# Patient Record
Sex: MALE | Race: WHITE | NOT HISPANIC OR LATINO | URBAN - METROPOLITAN AREA
[De-identification: names, ages, dates, MRNs, and addresses within clinical notes are randomized per-mention and may not be internally consistent; named-entity substitution may affect disease eponyms.]

---

## 2019-12-10 VITALS — BODY MASS INDEX: 27.64 KG/M2 | WEIGHT: 172 LBS | HEIGHT: 66 IN

## 2019-12-11 ENCOUNTER — HOSPITAL ENCOUNTER (OUTPATIENT)
Dept: GASTROENTEROLOGY | Facility: HOSPITAL | Age: 74
Setting detail: OUTPATIENT SURGERY
Discharge: HOME | End: 2019-12-11
Attending: SURGERY

## 2020-01-07 ENCOUNTER — ANESTHESIA EVENT (OUTPATIENT)
Dept: GASTROENTEROLOGY | Facility: HOSPITAL | Age: 75
End: 2020-01-07

## 2020-01-07 NOTE — PRE-PROCEDURE INSTRUCTIONS
No outpatient medications have been marked as taking for the 1/8/20 encounter [facility] Encounter) with Tunde Travis 118 02

## 2020-01-07 NOTE — ANESTHESIA PREPROCEDURE EVALUATION
Review of Systems/Medical History  Patient summary reviewed  Chart reviewed  No history of anesthetic complications     Cardiovascular  Exercise tolerance (METS): >4,     Pulmonary  Smoker ex-smoker  , No COPD , No asthma , No recent URI , No sleep apnea ,        GI/Hepatic    Liver disease , Hepatitis B,        Negative  ROS        Endo/Other  No diabetes , No history of thyroid disease ,      GYN       Hematology  Negative hematology ROS      Musculoskeletal    Arthritis     Neurology  Negative neurology ROS      Psychology   Negative psychology ROS              Physical Exam    Airway    Mallampati score: II  TM Distance: >3 FB  Neck ROM: full     Dental   upper dentures and lower dentures,     Cardiovascular  Cardiovascular exam normal    Pulmonary  Pulmonary exam normal     Other Findings        Anesthesia Plan  ASA Score- 2     Anesthesia Type- IV sedation with anesthesia with ASA Monitors  Additional Monitors:   Airway Plan:         Plan Factors-Patient not instructed to abstain from smoking on day of procedure  Patient did not smoke on day of surgery  Induction- intravenous  Postoperative Plan-     Informed Consent- Anesthetic plan and risks discussed with patient  I personally reviewed this patient with the CRNA  Discussed and agreed on the Anesthesia Plan with the CRNA  Jayashree Hensley 16-Apr-2018

## 2020-01-08 ENCOUNTER — HOSPITAL ENCOUNTER (OUTPATIENT)
Dept: GASTROENTEROLOGY | Facility: HOSPITAL | Age: 75
Setting detail: OUTPATIENT SURGERY
Discharge: HOME | End: 2020-01-08
Attending: SURGERY | Admitting: SURGERY
Payer: COMMERCIAL

## 2020-01-08 ENCOUNTER — ANESTHESIA (OUTPATIENT)
Dept: GASTROENTEROLOGY | Facility: HOSPITAL | Age: 75
End: 2020-01-08

## 2020-01-08 VITALS
SYSTOLIC BLOOD PRESSURE: 130 MMHG | HEIGHT: 66 IN | WEIGHT: 172 LBS | HEART RATE: 57 BPM | TEMPERATURE: 98.1 F | BODY MASS INDEX: 27.64 KG/M2 | RESPIRATION RATE: 18 BRPM | OXYGEN SATURATION: 97 % | DIASTOLIC BLOOD PRESSURE: 60 MMHG

## 2020-01-08 DIAGNOSIS — K62.5 HEMORRHAGE OF ANUS AND RECTUM: ICD-10-CM

## 2020-01-08 RX ORDER — PROPOFOL 10 MG/ML
INJECTION, EMULSION INTRAVENOUS AS NEEDED
Status: DISCONTINUED | OUTPATIENT
Start: 2020-01-08 | End: 2020-01-08 | Stop reason: SURG

## 2020-01-08 RX ORDER — LIDOCAINE HYDROCHLORIDE 10 MG/ML
INJECTION, SOLUTION EPIDURAL; INFILTRATION; INTRACAUDAL; PERINEURAL AS NEEDED
Status: DISCONTINUED | OUTPATIENT
Start: 2020-01-08 | End: 2020-01-08 | Stop reason: SURG

## 2020-01-08 RX ORDER — SODIUM CHLORIDE 9 MG/ML
125 INJECTION, SOLUTION INTRAVENOUS CONTINUOUS
Status: DISCONTINUED | OUTPATIENT
Start: 2020-01-08 | End: 2020-01-12 | Stop reason: HOSPADM

## 2020-01-08 RX ORDER — SODIUM CHLORIDE 9 MG/ML
INJECTION, SOLUTION INTRAVENOUS CONTINUOUS PRN
Status: DISCONTINUED | OUTPATIENT
Start: 2020-01-08 | End: 2020-01-08 | Stop reason: SURG

## 2020-01-08 RX ADMIN — PROPOFOL 100 MG: 10 INJECTION, EMULSION INTRAVENOUS at 10:00

## 2020-01-08 RX ADMIN — SODIUM CHLORIDE 125 ML/HR: 0.9 INJECTION, SOLUTION INTRAVENOUS at 09:07

## 2020-01-08 RX ADMIN — LIDOCAINE HYDROCHLORIDE 50 MG: 10 INJECTION, SOLUTION EPIDURAL; INFILTRATION; INTRACAUDAL; PERINEURAL at 10:00

## 2020-01-08 RX ADMIN — SODIUM CHLORIDE: 0.9 INJECTION, SOLUTION INTRAVENOUS at 10:00

## 2020-01-08 RX ADMIN — PROPOFOL 100 MG: 10 INJECTION, EMULSION INTRAVENOUS at 10:04

## 2021-04-14 RX ORDER — HYDROXYZINE HYDROCHLORIDE 25 MG/1
25 TABLET, FILM COATED ORAL EVERY 6 HOURS PRN
COMMUNITY
Start: 2020-08-08

## 2021-04-18 ENCOUNTER — TELEPHONE (OUTPATIENT)
Dept: OTHER | Facility: OTHER | Age: 76
End: 2021-04-18

## 2021-04-18 NOTE — TELEPHONE ENCOUNTER
Patient isn't feeling well and called to cancel appointment with Dr Casimiro Corona for 4/19 at 8:30am  Will call at a later date to reschedule

## 2021-06-08 PROBLEM — H91.90 HEARING LOSS: Status: ACTIVE | Noted: 2021-06-08

## 2021-06-08 PROBLEM — I10 ESSENTIAL HYPERTENSION: Status: ACTIVE | Noted: 2021-06-08

## 2021-06-09 ENCOUNTER — TELEPHONE (OUTPATIENT)
Dept: OTHER | Facility: OTHER | Age: 76
End: 2021-06-09

## 2022-10-03 ENCOUNTER — OFFICE VISIT (OUTPATIENT)
Dept: FAMILY MEDICINE CLINIC | Facility: CLINIC | Age: 77
End: 2022-10-03
Payer: COMMERCIAL

## 2022-10-03 VITALS
DIASTOLIC BLOOD PRESSURE: 80 MMHG | OXYGEN SATURATION: 98 % | SYSTOLIC BLOOD PRESSURE: 142 MMHG | HEART RATE: 78 BPM | TEMPERATURE: 97.9 F | HEIGHT: 66 IN | BODY MASS INDEX: 26.44 KG/M2 | RESPIRATION RATE: 16 BRPM | WEIGHT: 164.5 LBS

## 2022-10-03 DIAGNOSIS — Z71.89 ADVANCED CARE PLANNING/COUNSELING DISCUSSION: ICD-10-CM

## 2022-10-03 DIAGNOSIS — Z13.31 DEPRESSION SCREENING NEGATIVE: ICD-10-CM

## 2022-10-03 DIAGNOSIS — Z12.5 PROSTATE CANCER SCREENING: ICD-10-CM

## 2022-10-03 DIAGNOSIS — Z11.59 NEED FOR HEPATITIS C SCREENING TEST: ICD-10-CM

## 2022-10-03 DIAGNOSIS — Z28.21 PNEUMOCOCCAL VACCINATION DECLINED: ICD-10-CM

## 2022-10-03 DIAGNOSIS — Z00.00 MEDICARE ANNUAL WELLNESS VISIT, SUBSEQUENT: Primary | ICD-10-CM

## 2022-10-03 DIAGNOSIS — I10 ESSENTIAL HYPERTENSION: ICD-10-CM

## 2022-10-03 DIAGNOSIS — Z28.21 INFLUENZA VACCINATION DECLINED: ICD-10-CM

## 2022-10-03 PROCEDURE — 99497 ADVNCD CARE PLAN 30 MIN: CPT | Performed by: INTERNAL MEDICINE

## 2022-10-03 PROCEDURE — G0439 PPPS, SUBSEQ VISIT: HCPCS | Performed by: INTERNAL MEDICINE

## 2022-10-03 NOTE — PATIENT INSTRUCTIONS
Medicare Preventive Visit Patient Instructions  Thank you for completing your Welcome to Medicare Visit or Medicare Annual Wellness Visit today  Your next wellness visit will be due in one year (10/4/2023)  The screening/preventive services that you may require over the next 5-10 years are detailed below  Some tests may not apply to you based off risk factors and/or age  Screening tests ordered at today's visit but not completed yet may show as past due  Also, please note that scanned in results may not display below  Preventive Screenings:  Service Recommendations Previous Testing/Comments   Colorectal Cancer Screening  · Colonoscopy    · Fecal Occult Blood Test (FOBT)/Fecal Immunochemical Test (FIT)  · Fecal DNA/Cologuard Test  · Flexible Sigmoidoscopy Age: 39-70 years old   Colonoscopy: every 10 years (May be performed more frequently if at higher risk)  OR  FOBT/FIT: every 1 year  OR  Cologuard: every 3 years  OR  Sigmoidoscopy: every 5 years  Screening may be recommended earlier than age 39 if at higher risk for colorectal cancer  Also, an individualized decision between you and your healthcare provider will decide whether screening between the ages of 74-80 would be appropriate   Colonoscopy: 01/08/2020  FOBT/FIT: Not on file  Cologuard: Not on file  Sigmoidoscopy: Not on file          Prostate Cancer Screening Individualized decision between patient and health care provider in men between ages of 53-78   Medicare will cover every 12 months beginning on the day after your 50th birthday PSA: No results in last 5 years           Hepatitis C Screening Once for adults born between [facility]  More frequently in patients at high risk for Hepatitis C Hep C Antibody: Not on file        Diabetes Screening 1-2 times per year if you're at risk for diabetes or have pre-diabetes Fasting glucose: No results in last 5 years (No results in last 5 years)  A1C: No results in last 5 years (No results in last 5 years) Cholesterol Screening Once every 5 years if you don't have a lipid disorder  May order more often based on risk factors  Lipid panel: Not on file         Other Preventive Screenings Covered by Medicare:  1  Abdominal Aortic Aneurysm (AAA) Screening: covered once if your at risk  You're considered to be at risk if you have a family history of AAA or a male between the age of 73-68 who smoking at least 100 cigarettes in your lifetime  2  Lung Cancer Screening: covers low dose CT scan once per year if you meet all of the following conditions: (1) Age 50-69; (2) No signs or symptoms of lung cancer; (3) Current smoker or have quit smoking within the last 15 years; (4) You have a tobacco smoking history of at least 20 pack years (packs per day x number of years you smoked); (5) You get a written order from a healthcare provider  3  Glaucoma Screening: covered annually if you're considered high risk: (1) You have diabetes OR (2) Family history of glaucoma OR (3)  aged 48 and older OR (3)  American aged 72 and older  3  Osteoporosis Screening: covered every 2 years if you meet one of the following conditions: (1) Have a vertebral abnormality; (2) On glucocorticoid therapy for more than 3 months; (3) Have primary hyperparathyroidism; (4) On osteoporosis medications and need to assess response to drug therapy  5  HIV Screening: covered annually if you're between the age of 12-76  Also covered annually if you are younger than 13 and older than 72 with risk factors for HIV infection  For pregnant patients, it is covered up to 3 times per pregnancy      Immunizations:  Immunization Recommendations   Influenza Vaccine Annual influenza vaccination during flu season is recommended for all persons aged >= 6 months who do not have contraindications   Pneumococcal Vaccine   * Pneumococcal conjugate vaccine = PCV13 (Prevnar 13), PCV15 (Vaxneuvance), PCV20 (Prevnar 20)  * Pneumococcal polysaccharide vaccine = PPSV23 (Pneumovax) Adults 2364 years old: 1-3 doses may be recommended based on certain risk factors  Adults 72 years old: 1-2 doses may be recommended based off what pneumonia vaccine you previously received   Hepatitis B Vaccine 3 dose series if at intermediate or high risk (ex: diabetes, end stage renal disease, liver disease)   Tetanus (Td) Vaccine - COST NOT COVERED BY MEDICARE PART B Following completion of primary series, a booster dose should be given every 10 years to maintain immunity against tetanus  Td may also be given as tetanus wound prophylaxis  Tdap Vaccine - COST NOT COVERED BY MEDICARE PART B Recommended at least once for all adults  For pregnant patients, recommended with each pregnancy  Shingles Vaccine (Shingrix) - COST NOT COVERED BY MEDICARE PART B  2 shot series recommended in those aged 48 and above     Health Maintenance Due:      Topic Date Due    Hepatitis C Screening  Never done     Immunizations Due:      Topic Date Due    Pneumococcal Vaccine: 65+ Years (1 - PCV) Never done    Influenza Vaccine (1) Never done     Advance Directives   What are advance directives? Advance directives are legal documents that state your wishes and plans for medical care  These plans are made ahead of time in case you lose your ability to make decisions for yourself  Advance directives can apply to any medical decision, such as the treatments you want, and if you want to donate organs  What are the types of advance directives? There are many types of advance directives, and each state has rules about how to use them  You may choose a combination of any of the following:  · Living will: This is a written record of the treatment you want  You can also choose which treatments you do not want, which to limit, and which to stop at a certain time  This includes surgery, medicine, IV fluid, and tube feedings  · Durable power of  for healthcare [facility]):   This is a written record that states who you want to make healthcare choices for you when you are unable to make them for yourself  This person, called a proxy, is usually a family member or a friend  You may choose more than 1 proxy  · Do not resuscitate (DNR) order:  A DNR order is used in case your heart stops beating or you stop breathing  It is a request not to have certain forms of treatment, such as CPR  A DNR order may be included in other types of advance directives  · Medical directive: This covers the care that you want if you are in a coma, near death, or unable to make decisions for yourself  You can list the treatments you want for each condition  Treatment may include pain medicine, surgery, blood transfusions, dialysis, IV or tube feedings, and a ventilator (breathing machine)  · Values history: This document has questions about your views, beliefs, and how you feel and think about life  This information can help others choose the care that you would choose  Why are advance directives important? An advance directive helps you control your care  Although spoken wishes may be used, it is better to have your wishes written down  Spoken wishes can be misunderstood, or not followed  Treatments may be given even if you do not want them  An advance directive may make it easier for your family to make difficult choices about your care  © Copyright [employer] 2018 Information is for End User's use only and may not be sold, redistributed or otherwise used for commercial purposes  All illustrations and images included in CareNotes® are the copyrighted property of A D A M , Inc  or [employer] [facility] Prevention   AMBULATORY CARE:   Fall prevention  includes ways to make your home and other areas safer  It also includes ways you can move more carefully to prevent a fall  Health conditions that cause changes in your blood pressure, vision, or muscle strength and coordination may increase your risk for falls   Medicines may also increase your risk for falls if they make you dizzy, weak, or sleepy  Call 911 or have someone else call if:   · You have fallen and are unconscious  · You have fallen and cannot move part of your body  Contact your healthcare provider if:   · You have fallen and have pain or a headache  · You have questions or concerns about your condition or care  Fall prevention tips:   · Stand or sit up slowly  This may help you keep your balance and prevent falls  · Use assistive devices as directed  Your healthcare provider may suggest that you use a cane or walker to help you keep your balance  You may need to have grab bars put in your bathroom near the toilet or in the shower  · Wear shoes that fit well and have soles that   Wear shoes both inside and outside  Use slippers with good   Do not wear shoes with high heels  · Wear a personal alarm  This is a device that allows you to call 911 if you fall and need help  Ask your healthcare provider for more information  · Stay active  Exercise can help strengthen your muscles and improve your balance  Your healthcare provider may recommend water aerobics or walking  He or she may also recommend physical therapy to improve your coordination  Never start an exercise program without talking to your healthcare provider first          · Manage your medical conditions  Keep all appointments with your healthcare providers  Visit your eye doctor as directed  Home safety tips:       · Add items to prevent falls in the bathroom  Put nonslip strips on your bath or shower floor to prevent you from slipping  Use a bath mat if you do not have carpet in the bathroom  This will prevent you from falling when you step out of the bath or shower  Use a shower seat so you do not need to stand while you shower  Sit on the toilet or a chair in your bathroom to dry yourself and put on clothing   This will prevent you from losing your balance from drying or dressing yourself while you are standing  · Keep paths clear  Remove books, shoes, and other objects from walkways and stairs  Place cords for telephones and lamps out of the way so that you do not need to walk over them  Tape them down if you cannot move them  Remove small rugs  If you cannot remove a rug, secure it with double-sided tape  This will prevent you from tripping  · Install bright lights in your home  Use night lights to help light paths to the bathroom or kitchen  Always turn on the light before you start walking  · Keep items you use often on shelves within reach  Do not use a step stool to help you reach an item  · Paint or place reflective tape on the edges of your stairs  This will help you see the stairs better  Follow up with your doctor as directed:  Write down your questions so you remember to ask them during your visits  © [employer] 2022 Information is for End User's use only and may not be sold, redistributed or otherwise used for commercial purposes  All illustrations and images included in CareNotes® are the copyrighted property of A D A M , Inc  or Patria Costa  The above information is an  only  It is not intended as medical advice for individual conditions or treatments  Talk to your doctor, nurse or pharmacist before following any medical regimen to see if it is safe and effective for you

## 2022-10-03 NOTE — PROGRESS NOTES
Assessment and Plan:     Problem List Items Addressed This Visit    None     Luisa Tinajero doing really well-he's still working so he can get enough money together for a down payment on a new Mercedes-very active, keeps very busy with his wife and his boxer dog Shadow-not taking any medications-driving without any problems-declined flu shot-labs ordered-declines issues with alcohol, despite positive screener-says he has an 8 oz glass of wine with dinner and that's that     Preventive health issues were discussed with patient, and age appropriate screening tests were ordered as noted in patient's After Visit Summary  Personalized health advice and appropriate referrals for health education or preventive services given if needed, as noted in patient's After Visit Summary  History of Present Illness:     Patient presents for a Medicare Wellness Visit    Luisa Tinajero here for [address] [place] [address], [facility] discussion     Patient Care Team:  Segundo Melissa MD as PCP - General (Internal Medicine)     Review of Systems:     Review of Systems   Constitutional: Negative  Respiratory: Negative  Cardiovascular: Negative  Gastrointestinal: Negative  Genitourinary: Negative  Musculoskeletal: Negative  Neurological: Negative  Hematological: Negative  Psychiatric/Behavioral: Negative           Problem List:     Patient Active Problem List   Diagnosis    Hearing loss    Essential hypertension      Past Medical and Surgical History:     Past Medical History:   Diagnosis Date    Actinic keratosis     scalp    Basal cell carcinoma of forehead     History of transfusion     HTN (hypertension)     Infectious viral hepatitis     Left rotator cuff tear     [facility]30[facility]    Malignant melanoma in situ ([facility] [address] ) 05/09/2019    back; jonathan     Past Surgical History:   Procedure Laterality Date    COLONOSCOPY  01/08/2020    diverticulosis    FRACTURE SURGERY  1983    trauma    SKIN LESION EXCISION        Family History:     Family History   Problem Relation Age of Onset    No Known Problems Mother     No Known Problems Father       Social History:     Social History     Socioeconomic History    Marital status: Single     Spouse name: Not on file    Number of children: Not on file    Years of education: Not on file    Highest education level: Not on file   Occupational History    Not on file   Tobacco Use    Smoking status: Former Smoker    Smokeless tobacco: Never Used    Tobacco comment: quit at age 19   Vaping Use    Vaping Use: Never used   Substance and Sexual Activity    Alcohol use: Yes     Comment: occ    Drug use: Never    Sexual activity: Not on file   Other Topics Concern    Not on file   Social History Narrative    Most recent tobacco use screenin2019    Occupation: Retired    Marital status:     Sexual orientation: Heterosexual    Exercise level: Occasional    Runs 5 miles daily with dog    Diet: Regular    Has smoked since age: teenager    Alcohol intake: Occasional socially    Caffeine intake: Occasional    coffee every am    Chewing tobacco: none    Illicit drugs: denies    Advance directive: No    Live alone or with others: with others    Are there stairs in your home: Yes    International travel: yes    Pets: Yes dog     Social Determinants of Health     Financial Resource Strain: Not on file   Food Insecurity: Not on file   Transportation Needs: Not on file   Physical Activity: Not on file   Stress: Not on file   Social Connections: Not on file   Intimate Partner Violence: Not on file   Housing Stability: Not on file      Medications and Allergies:     Current Outpatient Medications   Medication Sig Dispense Refill    hydrOXYzine HCL (ATARAX) 25 mg tablet Take 25 mg by mouth every 6 (six) hours as needed       No current facility-administered medications for this visit       No Known Allergies   Immunizations:     Immunization History   Administered Date(s) Administered   Osbaldo Hassan COVID-19 PFIZER VACCINE 0 3 ML IM 02/23/2021, 03/16/2021, 10/25/2021    COVID-19 Pfizer Vac BIVALENT Hardik-sucrose 12 Yr+ IM (BOOSTER ONLY) 09/26/2022    COVID-19 Pfizer vac (Hardik-sucrose, gray cap) 12 yr+ IM 05/05/2022      Health Maintenance:         Topic Date Due    Hepatitis C Screening  Never done         Topic Date Due    Pneumococcal Vaccine: 65+ Years (1 - PCV) Never done    Influenza Vaccine (1) Never done      Medicare Screening Tests and Risk Assessments:     Leonides Ryan is here for his Subsequent Wellness visit  Last Medicare Wellness visit information reviewed, patient interviewed and updates made to the record today  Health Risk Assessment:   Patient rates overall health as excellent  Patient feels that their physical health rating is same  Patient is satisfied with their life  Eyesight was rated as same  Hearing was rated as same  Patient feels that their emotional and mental health rating is same  Patients states they are never, rarely angry  Patient states they are sometimes unusually tired/fatigued  Pain experienced in the last 7 days has been none  Patient states that he has experienced no weight loss or gain in last 6 months  Depression Screening:   PHQ-2 Score: 0      Fall Risk Screening: In the past year, patient has experienced: history of falling in past year    Number of falls: 1  Injured during fall?: Yes    Feels unsteady when standing or walking?: No    Worried about falling?: No      Home Safety:  Patient does not have trouble with stairs inside or outside of their home  Patient has working smoke alarms and has working carbon monoxide detector  Home safety hazards include: none  Nutrition:   Current diet is Regular  Medications:   Patient is not currently taking any over-the-counter supplements  Patient is not able to manage medications       Activities of Daily Living (ADLs)/Instrumental Activities of Daily Living (IADLs):   Walk and transfer into and out of bed and chair?: Yes  Dress and groom yourself?: Yes    Bathe or shower yourself?: Yes    Feed yourself? Yes  Do your laundry/housekeeping?: Yes  Manage your money, pay your bills and track your expenses?: Yes  Make your own meals?: Yes    Do your own shopping?: Yes    Previous Hospitalizations:   Any hospitalizations or ED visits within the last 12 months?: No      Advance Care Planning:   Living will: No    Durable POA for healthcare: No    Advanced directive: No    Advanced directive counseling given: Yes    Five wishes given: Yes    End of Life Decisions reviewed with patient: Yes      Cognitive Screening:   Provider or family/friend/caregiver concerned regarding cognition?: No    PREVENTIVE SCREENINGS      Cardiovascular Screening:    General: Risks and Benefits Discussed    Due for: Lipid Panel      Diabetes Screening:     General: Risks and Benefits Discussed    Due for: Blood Glucose      Colorectal Cancer Screening:     General: Risks and Benefits Discussed and Screening Current      Prostate Cancer Screening:    General: Risks and Benefits Discussed    Due for: PSA      Osteoporosis Screening:    General: Screening Not Indicated      Abdominal Aortic Aneurysm (AAA) Screening:    Risk factors include: tobacco use        General: Screening Not Indicated      Lung Cancer Screening:     General: Screening Not Indicated      Hepatitis C Screening:    General: Risks and Benefits Discussed    Hep C Screening Accepted: Yes      Screening, Brief Intervention, and Referral to Treatment (SBIRT)    Screening  Typical number of drinks in a day: 1  Typical number of drinks in a week: 7  Interpretation: Low risk drinking behavior  AUDIT-C Screenin) How often did you have a drink containing alcohol in the past year? 4 or more times a week  2) How many drinks did you have on a typical day when you were drinking in the past year? 1 to 2  3) How often did you have 6 or more drinks on one occasion in the past year? never    AUDIT-C Score: 4  Interpretation: Score 4-12 (male): POSITIVE screen for alcohol misuse    Single Item Drug Screening:  How often have you used an illegal drug (including marijuana) or a prescription medication for non-medical reasons in the past year? never    Single Item Drug Screen Score: 0  Interpretation: Negative screen for possible drug use disorder    No exam data present     Physical Exam:     There were no vitals taken for this visit  Physical Exam  Constitutional:       Appearance: Normal appearance  HENT:      Head: Normocephalic and atraumatic  Right Ear: External ear normal       Left Ear: External ear normal       Nose: Nose normal       Mouth/Throat:      Mouth: Mucous membranes are moist    Eyes:      Pupils: Pupils are equal, round, and reactive to light  Neck:      Vascular: No carotid bruit  Cardiovascular:      Rate and Rhythm: Normal rate and regular rhythm  Pulmonary:      Effort: Pulmonary effort is normal       Breath sounds: Normal breath sounds  Abdominal:      Palpations: Abdomen is soft  Musculoskeletal:      Cervical back: Normal range of motion and neck supple  Comments: Some arthritic changes hands   Neurological:      General: No focal deficit present  Mental Status: He is alert and oriented to person, place, and time  Psychiatric:         Mood and Affect: Mood normal          Behavior: Behavior normal           Zeferino Remy MD     BMI Counseling: Body mass index is 26 96 kg/m²  The BMI is above normal  Nutrition recommendations include reducing portion sizes, decreasing overall calorie intake, 3-5 servings of fruits/vegetables daily, reducing fast food intake, consuming healthier snacks, decreasing soda and/or juice intake, moderation in carbohydrate intake, increasing intake of lean protein, reducing intake of saturated fat and trans fat and reducing intake of cholesterol   Exercise recommendations include exercising 3-5 times per week and strength training exercises  Falls Plan of Care: Balance, strength, and gait training instructions were provided

## 2022-10-27 ENCOUNTER — TELEPHONE (OUTPATIENT)
Dept: FAMILY MEDICINE CLINIC | Facility: CLINIC | Age: 77
End: 2022-10-27

## 2022-10-27 NOTE — TELEPHONE ENCOUNTER
Pt called looking for a letter stating he was under you care regarding these dates of 9/13-9/15  He didn't go to work and now he will lose his job without this letter

## 2022-12-13 ENCOUNTER — RA CDI HCC (OUTPATIENT)
Dept: OTHER | Facility: HOSPITAL | Age: 77
End: 2022-12-13

## 2022-12-13 NOTE — PROGRESS NOTES
[address]  coding opportunities       Chart reviewed, no opportunity found: CHART REVIEWED, NO OPPORTUNITY FOUND        Patients Insurance     Medicare Insurance: Medicare

## 2022-12-19 ENCOUNTER — OFFICE VISIT (OUTPATIENT)
Dept: FAMILY MEDICINE CLINIC | Facility: CLINIC | Age: 77
End: 2022-12-19

## 2022-12-19 VITALS
HEIGHT: 66 IN | SYSTOLIC BLOOD PRESSURE: 138 MMHG | TEMPERATURE: 97.2 F | HEART RATE: 78 BPM | DIASTOLIC BLOOD PRESSURE: 80 MMHG | WEIGHT: 161.25 LBS | OXYGEN SATURATION: 98 % | BODY MASS INDEX: 25.91 KG/M2 | RESPIRATION RATE: 16 BRPM

## 2022-12-19 DIAGNOSIS — N52.9 ERECTILE DYSFUNCTION, UNSPECIFIED ERECTILE DYSFUNCTION TYPE: Primary | ICD-10-CM

## 2022-12-19 DIAGNOSIS — R39.11 URINARY HESITANCY: ICD-10-CM

## 2022-12-19 RX ORDER — TAMSULOSIN HYDROCHLORIDE 0.4 MG/1
0.4 CAPSULE ORAL
Qty: 30 CAPSULE | Refills: 5 | Status: SHIPPED | OUTPATIENT
Start: 2022-12-19

## 2022-12-19 NOTE — PROGRESS NOTES
Assessment/Plan:Maykel's prostate seemed a bit enlarged, although no masses felt-will try and start some flomax-discussed benefits, side effects, etc-testosterone, FSH/LH and PSA ordered and will touch base with patient in near future with results-possibly will need Urology appt         Problem List Items Addressed This Visit    None  Visit Diagnoses     Erectile dysfunction, unspecified erectile dysfunction type    -  Primary    Relevant Orders    Testosterone, free, total    FSH and LH    Urinary hesitancy        Relevant Medications    tamsulosin (FLOMAX) 0 4 mg            Subjective:      Patient ID: Cooper Coronado is a 76 y o  male  Rox Pilling here with some prostate concerns-says his urinary stream is markedly more decreased, no blood or dysuria, but says he does get up at least once per night to urinate  Says his sex life isn't what it was either, although he was expecting that to a degree  The following portions of the patient's history were reviewed and updated as appropriate:   Past Medical History:  He has a past medical history of Actinic keratosis, Basal cell carcinoma of forehead, History of transfusion, HTN (hypertension), Infectious viral hepatitis, Left rotator cuff tear, and Malignant melanoma in situ ([facility] [address] ) (05/09/2019)  ,  _______________________________________________________________________  Medical Problems:  does not have any pertinent problems on file ,  _______________________________________________________________________  Past Surgical History:   has a past surgical history that includes Colonoscopy (01/08/2020); Fracture surgery (1983); and Skin lesion excision  ,  _______________________________________________________________________  Family History:  family history includes No Known Problems in his father and mother ,  _______________________________________________________________________  Social History:   reports that he has quit smoking   He has never used smokeless tobacco  He reports current alcohol use  He reports that he does not use drugs  ,  _______________________________________________________________________  Allergies:  has No Known Allergies     _______________________________________________________________________  Current Outpatient Medications   Medication Sig Dispense Refill   • Multiple Vitamin (MULTI VITAMIN MENS PO)      • tamsulosin (FLOMAX) 0 4 mg Take 1 capsule (0 4 mg total) by mouth daily with dinner 30 capsule 5     No current facility-administered medications for this visit      _______________________________________________________________________  Review of Systems   Constitutional: Negative for fever  HENT: Negative  Cardiovascular: Negative  Gastrointestinal: Negative  Genitourinary: Positive for decreased urine volume, difficulty urinating and frequency  Negative for dysuria, flank pain and hematuria  Hematological: Negative  Objective:  Vitals:    12/19/22 1343   BP: 138/80   BP Location: Left arm   Patient Position: Sitting   Cuff Size: Adult   Pulse: 78   Resp: 16   Temp: (!) 97 2 °F (36 2 °C)   TempSrc: Temporal   SpO2: 98%   Weight: 73 1 kg (161 lb 4 oz)   Height: 5' 5 5" (1 664 m)     Body mass index is 26 43 kg/m²  Physical Exam  Constitutional:       Appearance: Normal appearance  HENT:      Head: Normocephalic and atraumatic  Right Ear: External ear normal       Nose: Nose normal    Pulmonary:      Effort: Pulmonary effort is normal    Genitourinary:     Comments: Generous in size , area of redness perirectal area-no masses   Skin:     Capillary Refill: Capillary refill takes less than 2 seconds  Neurological:      Mental Status: He is alert  Psychiatric:         Mood and Affect: Mood normal          Thought Content:  Thought content normal

## 2023-01-02 DIAGNOSIS — R39.11 URINARY HESITANCY: ICD-10-CM

## 2023-01-03 RX ORDER — TAMSULOSIN HYDROCHLORIDE 0.4 MG/1
CAPSULE ORAL
Qty: 90 CAPSULE | Refills: 2 | Status: SHIPPED | OUTPATIENT
Start: 2023-01-03

## 2023-05-30 ENCOUNTER — OFFICE VISIT (OUTPATIENT)
Dept: FAMILY MEDICINE CLINIC | Facility: CLINIC | Age: 78
End: 2023-05-30

## 2023-05-30 VITALS
HEIGHT: 66 IN | RESPIRATION RATE: 16 BRPM | OXYGEN SATURATION: 98 % | SYSTOLIC BLOOD PRESSURE: 122 MMHG | DIASTOLIC BLOOD PRESSURE: 72 MMHG | BODY MASS INDEX: 25.13 KG/M2 | TEMPERATURE: 97.5 F | WEIGHT: 156.38 LBS | HEART RATE: 53 BPM

## 2023-05-30 DIAGNOSIS — Z02.4 ENCOUNTER FOR DRIVER'S LICENSE HISTORY AND PHYSICAL: Primary | ICD-10-CM
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
## 2023-07-28 ENCOUNTER — OFFICE VISIT (OUTPATIENT)
Dept: FAMILY MEDICINE CLINIC | Facility: CLINIC | Age: 78
End: 2023-07-28
Payer: COMMERCIAL

## 2023-07-28 VITALS
BODY MASS INDEX: 25.71 KG/M2 | WEIGHT: 160 LBS | TEMPERATURE: 97.9 F | HEART RATE: 61 BPM | RESPIRATION RATE: 16 BRPM | DIASTOLIC BLOOD PRESSURE: 80 MMHG | OXYGEN SATURATION: 98 % | SYSTOLIC BLOOD PRESSURE: 134 MMHG | HEIGHT: 66 IN

## 2023-07-28 DIAGNOSIS — I10 ESSENTIAL HYPERTENSION: ICD-10-CM

## 2023-07-28 DIAGNOSIS — Z71.1 WORRIED WELL: Primary | ICD-10-CM

## 2023-07-28 PROCEDURE — 99214 OFFICE O/P EST MOD 30 MIN: CPT | Performed by: INTERNAL MEDICINE

## 2023-07-28 NOTE — PROGRESS NOTES
Assessment/Plan:Maykel's wife was worried, but by our scale he actually gained weight. Has a very, very active lifestyle and denies any symptoms-asked him to reassure his wife of this. He does have labs ordered that were ordered in Dec so encouraged him to get those done         Problem List Items Addressed This Visit        Cardiovascular and Mediastinum    Essential hypertension   Other Visit Diagnoses     Worried well    -  Primary            Subjective:      Patient ID: Tadeo Ramos is a 76 y.o. male. Akil Laurent here because his wife was concerned he's losing weight. 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 He denies any symptoms of anything at all      The following portions of the patient's history were reviewed and updated as appropriate:   Past Medical History:  He has a past medical history of Actinic keratosis, Basal cell carcinoma of forehead, History of transfusion, HTN (hypertension), Infectious viral hepatitis, Left rotator cuff tear, and Malignant melanoma in situ ([address]) (05/09/2019). ,  _______________________________________________________________________  Medical Problems:  does not have any pertinent problems on file.,  _______________________________________________________________________  Past Surgical History:   has a past surgical history that includes Colonoscopy (01/08/2020); Fracture surgery (1983); and Skin lesion excision. ,  _______________________________________________________________________  Family History:  family history includes No Known Problems in his father and mother.,  _______________________________________________________________________  Social History:   reports that he has quit smoking. He has never used smokeless tobacco. He reports current alcohol use. He reports that he does not use drugs. ,  _______________________________________________________________________  Allergies:  has No Known Allergies. .  _______________________________________________________________________  No current outpatient medications on file. No current facility-administered medications for this visit.     _______________________________________________________________________  Review of Systems   Constitutional: Negative. HENT: Negative. Eyes: Negative. Respiratory: Negative. Cardiovascular: Negative. Gastrointestinal: Negative. Genitourinary: Negative. Musculoskeletal: Negative. Neurological: Negative. Hematological: Negative. Psychiatric/Behavioral: Negative. Objective:  Vitals:    07/28/23 1312   BP: 134/80   BP Location: Left arm   Patient Position: Sitting   Cuff Size: Adult   Pulse: 61   Resp: 16   Temp: 97.9 °F (36.6 °C)   TempSrc: Tympanic   SpO2: 98%   Weight: 72.6 kg (160 lb)   Height: 5' 5.5" (1.664 m)     Body mass index is 26.22 kg/m². Physical Exam  Constitutional:       Appearance: Normal appearance. HENT:      Head: Normocephalic and atraumatic. Right Ear: External ear normal.      Left Ear: External ear normal.      Nose: Nose normal.      Mouth/Throat:      Mouth: Mucous membranes are moist.   Cardiovascular:      Rate and Rhythm: Normal rate and regular rhythm. Pulmonary:      Breath sounds: Normal breath sounds. Abdominal:      Palpations: Abdomen is soft. Musculoskeletal:         General: Normal range of motion. Skin:     General: Skin is warm. Capillary Refill: Capillary refill takes less than 2 seconds. Neurological:      General: No focal deficit present. Mental Status: He is alert and oriented to person, place, and time. Mental status is at baseline.    Psychiatric:         Mood and Affect: Mood normal. Behavior: Behavior normal.         Thought Content:  Thought content normal.         Judgment: Judgment normal.

## 2023-09-27 ENCOUNTER — APPOINTMENT (OUTPATIENT)
Dept: LAB | Facility: HOSPITAL | Age: 78
End: 2023-09-27
Attending: FAMILY MEDICINE
Payer: COMMERCIAL

## 2023-09-27 ENCOUNTER — OFFICE VISIT (OUTPATIENT)
Dept: FAMILY MEDICINE CLINIC | Facility: CLINIC | Age: 78
End: 2023-09-27
Payer: COMMERCIAL

## 2023-09-27 ENCOUNTER — HOSPITAL ENCOUNTER (OUTPATIENT)
Dept: RADIOLOGY | Facility: HOSPITAL | Age: 78
Discharge: HOME | End: 2023-09-27
Payer: COMMERCIAL

## 2023-09-27 VITALS
HEIGHT: 66 IN | SYSTOLIC BLOOD PRESSURE: 132 MMHG | BODY MASS INDEX: 25.71 KG/M2 | DIASTOLIC BLOOD PRESSURE: 80 MMHG | OXYGEN SATURATION: 95 % | HEART RATE: 62 BPM | RESPIRATION RATE: 16 BRPM | TEMPERATURE: 98.3 F | WEIGHT: 160 LBS

## 2023-09-27 DIAGNOSIS — M25.461 PAIN AND SWELLING OF RIGHT KNEE: ICD-10-CM

## 2023-09-27 DIAGNOSIS — M25.561 PAIN AND SWELLING OF RIGHT KNEE: ICD-10-CM

## 2023-09-27 DIAGNOSIS — Z00.00 MEDICARE ANNUAL WELLNESS VISIT, SUBSEQUENT: ICD-10-CM

## 2023-09-27 DIAGNOSIS — I10 ESSENTIAL HYPERTENSION: ICD-10-CM

## 2023-09-27 DIAGNOSIS — M25.461 PAIN AND SWELLING OF RIGHT KNEE: Primary | ICD-10-CM

## 2023-09-27 DIAGNOSIS — Z11.59 NEED FOR HEPATITIS C SCREENING TEST: ICD-10-CM

## 2023-09-27 DIAGNOSIS — M25.561 PAIN AND SWELLING OF RIGHT KNEE: Primary | ICD-10-CM

## 2023-09-27 DIAGNOSIS — L23.7 POISON IVY DERMATITIS: ICD-10-CM

## 2023-09-27 DIAGNOSIS — Z12.5 PROSTATE CANCER SCREENING: ICD-10-CM

## 2023-09-27 LAB
ALBUMIN SERPL BCP-MCNC: 4.2 G/DL (ref 3.5–5)
ALP SERPL-CCNC: 60 U/L (ref 34–104)
ALT SERPL W P-5'-P-CCNC: 20 U/L (ref 7–52)
ANION GAP SERPL CALCULATED.3IONS-SCNC: 12 MMOL/L
ASO AB TITR SER LA: NORMAL {TITER}
AST SERPL W P-5'-P-CCNC: 26 U/L (ref 13–39)
B BURGDOR IGG+IGM SER QL IA: NEGATIVE
BILIRUB SERPL-MCNC: 1.7 MG/DL (ref 0.2–1)
BUN SERPL-MCNC: 9 MG/DL (ref 5–25)
CALCIUM SERPL-MCNC: 9.4 MG/DL (ref 8.4–10.2)
CHLORIDE SERPL-SCNC: 100 MMOL/L (ref 96–108)
CHOLEST SERPL-MCNC: 239 MG/DL
CO2 SERPL-SCNC: 26 MMOL/L (ref 21–32)
CREAT SERPL-MCNC: 0.73 MG/DL (ref 0.6–1.3)
CRP SERPL QL: 30.5 MG/L
FSH SERPL-ACNC: 39 MIU/ML
GFR SERPL CREATININE-BSD FRML MDRD: 87 ML/MIN/1.73SQ M
GLUCOSE P FAST SERPL-MCNC: 101 MG/DL (ref 65–99)
HCV AB SER QL: NORMAL
HDLC SERPL-MCNC: 74 MG/DL
LDLC SERPL CALC-MCNC: 131 MG/DL (ref 0–100)
LH SERPL-ACNC: 18.2 MIU/ML
NONHDLC SERPL-MCNC: 165 MG/DL
POTASSIUM SERPL-SCNC: 3.9 MMOL/L (ref 3.5–5.3)
PROCALCITONIN SERPL-MCNC: 0.08 NG/ML
PROT SERPL-MCNC: 7.4 G/DL (ref 6.4–8.4)
PSA SERPL-MCNC: 3.41 NG/ML (ref 0–4)
SODIUM SERPL-SCNC: 138 MMOL/L (ref 135–147)
TRIGL SERPL-MCNC: 172 MG/DL
TSH SERPL DL<=0.05 MIU/L-ACNC: 2.47 UIU/ML (ref 0.45–4.5)
URATE SERPL-MCNC: 4.7 MG/DL (ref 3.5–8.5)

## 2023-09-27 PROCEDURE — 83002 ASSAY OF GONADOTROPIN (LH): CPT

## 2023-09-27 PROCEDURE — 80061 LIPID PANEL: CPT

## 2023-09-27 PROCEDURE — 36415 COLL VENOUS BLD VENIPUNCTURE: CPT

## 2023-09-27 PROCEDURE — 86618 LYME DISEASE ANTIBODY: CPT

## 2023-09-27 PROCEDURE — 86803 HEPATITIS C AB TEST: CPT

## 2023-09-27 PROCEDURE — 86063 ANTISTREPTOLYSIN O SCREEN: CPT

## 2023-09-27 PROCEDURE — 73562 X-RAY EXAM OF KNEE 3: CPT

## 2023-09-27 PROCEDURE — 84145 PROCALCITONIN (PCT): CPT

## 2023-09-27 PROCEDURE — 83001 ASSAY OF GONADOTROPIN (FSH): CPT

## 2023-09-27 PROCEDURE — 80053 COMPREHEN METABOLIC PANEL: CPT

## 2023-09-27 PROCEDURE — 84550 ASSAY OF BLOOD/URIC ACID: CPT

## 2023-09-27 PROCEDURE — 99214 OFFICE O/P EST MOD 30 MIN: CPT | Performed by: FAMILY MEDICINE

## 2023-09-27 PROCEDURE — 84443 ASSAY THYROID STIM HORMONE: CPT

## 2023-09-27 PROCEDURE — 86140 C-REACTIVE PROTEIN: CPT

## 2023-09-27 PROCEDURE — G0103 PSA SCREENING: HCPCS

## 2023-09-27 RX ORDER — AMOXICILLIN AND CLAVULANATE POTASSIUM 875; 125 MG/1; MG/1
1 TABLET, FILM COATED ORAL EVERY 12 HOURS SCHEDULED
Qty: 14 TABLET | Refills: 0 | Status: SHIPPED | OUTPATIENT
Start: 2023-09-27 | End: 2023-10-04

## 2023-09-27 RX ORDER — DOXYCYCLINE HYCLATE 100 MG/1
100 CAPSULE ORAL EVERY 12 HOURS SCHEDULED
Qty: 14 CAPSULE | Refills: 0 | Status: SHIPPED | OUTPATIENT
Start: 2023-09-27 | End: 2023-10-04

## 2023-09-27 RX ORDER — SENNOSIDES 8.6 MG
650 CAPSULE ORAL EVERY 8 HOURS PRN
Qty: 90 TABLET | Refills: 0 | Status: SHIPPED | OUTPATIENT
Start: 2023-09-27 | End: 2023-10-06

## 2023-09-27 RX ORDER — PREDNISONE 20 MG/1
20 TABLET ORAL 2 TIMES DAILY WITH MEALS
Qty: 10 TABLET | Refills: 0 | Status: SHIPPED | OUTPATIENT
Start: 2023-09-27 | End: 2023-10-02

## 2023-09-27 NOTE — PATIENT INSTRUCTIONS
Go to the [facility] and obtain labs/blood work and x-ray as ordered today. If you keep driving down [address]. It will become .com if you make a left on [address] and you will see the [address] will start taking prednisone 20 mg 1 tablet twice a day with meals for the next 5 days. You will also start taking Augmentin that is amoxicillin potassium clavulanate antibiotic twice a day with meals for 7 days. He will also take doxycycline 100 mg twice a day for 7 days. All of these medications can irritate your stomach cause gastritis. Make sure you take it with food and you can take over-the-counter Nexium if needed. You can also take Tums if needed for gastritis. If your symptoms have not proved in the next 48 hours you might have to go to the emergency room to get the knee drained.

## 2023-09-27 NOTE — PROGRESS NOTES
Subjective:      Patient ID: Carina Slaughter is a 78 y.o. male. Has leg swelling for 2 days, generally healthy, did not do any recent labs as ordered  He had poison ivy on his right leg and has a rash from it  Currently works at [employer] to pay off his [employer]  Right knee pain mainly while walking uphill  No history or gout, no trauma, no fever      Past Medical History:   Diagnosis Date   • Actinic keratosis     scalp   • Basal cell carcinoma of forehead    • History of transfusion    • HTN (hypertension)    • Infectious viral hepatitis    • Left rotator cuff tear     saw ortho at [facility] 9/30/15   • Malignant melanoma in situ ([address]) 05/09/2019    back; jonathan       Family History   Problem Relation Age of Onset   • No Known Problems Mother    • No Known Problems Father        Past Surgical History:   Procedure Laterality Date   • COLONOSCOPY  01/08/2020    diverticulosis   • FRACTURE SURGERY  1983    trauma   • SKIN LESION EXCISION          reports that he has quit smoking. He has never used smokeless tobacco. He reports current alcohol use. He reports that he does not use drugs. Current Outpatient Medications:   •  amoxicillin-clavulanate (AUGMENTIN) 875-125 mg per tablet, Take 1 tablet by mouth every 12 (twelve) hours for 7 days, Disp: 14 tablet, Rfl: 0  •  doxycycline hyclate (VIBRAMYCIN) 100 mg capsule, Take 1 capsule (100 mg total) by mouth every 12 (twelve) hours for 7 days, Disp: 14 capsule, Rfl: 0  •  predniSONE 20 mg tablet, Take 1 tablet (20 mg total) by mouth 2 (two) times a day with meals for 5 days, Disp: 10 tablet, Rfl: 0    The following portions of the patient's history were reviewed and updated as appropriate: allergies, current medications, past family history, past medical history, past social history, past surgical history and problem list.    Review of Systems   Constitutional: Negative for chills and fever. HENT: Negative for congestion, rhinorrhea and sore throat. Eyes: Negative for discharge, redness and itching. Respiratory: Negative for chest tightness, shortness of breath and wheezing. Cardiovascular: Negative for chest pain and palpitations. Gastrointestinal: Negative for abdominal pain, constipation and diarrhea. Genitourinary: Negative for dysuria. Musculoskeletal: Positive for arthralgias and joint swelling. Skin: Positive for rash. Negative for pallor. Neurological: Negative for dizziness, weakness, numbness and headaches. PHQ-2/9 Depression Screening               Objective:    /80 (BP Location: Left arm, Patient Position: Sitting, Cuff Size: Adult)   Pulse 62   Temp 98.3 °F (36.8 °C) (Tympanic)   Resp 16   Ht 5' 5.5" (1.664 m)   Wt 72.6 kg (160 lb)   SpO2 95%   BMI 26.22 kg/m²      Physical Exam  Vitals and nursing note reviewed. Constitutional:       Appearance: Normal appearance. HENT:      Head: Normocephalic and atraumatic. Right Ear: External ear normal.      Left Ear: External ear normal.   Eyes:      General:         Right eye: No discharge. Left eye: No discharge. Conjunctiva/sclera: Conjunctivae normal.   Cardiovascular:      Rate and Rhythm: Normal rate and regular rhythm. Heart sounds: No murmur heard. Pulmonary:      Effort: Pulmonary effort is normal.      Breath sounds: Normal breath sounds. No wheezing. Abdominal:      General: There is no distension. Palpations: Abdomen is soft. Tenderness: There is no abdominal tenderness. Musculoskeletal:         General: Swelling, tenderness and deformity present. Right lower leg: No edema. Left lower leg: No edema. Comments: Right knee effusion, erythema, swelling+,    Skin:     Findings: Rash (dermatitis, vesicles, erythema over the lright lower extremity) present. No lesion. Neurological:      Mental Status: He is alert. Mental status is at baseline.    Psychiatric:         Mood and Affect: Mood normal. Thought Content: Thought content normal.                   Recent Results (from the past 8736 hour(s))   Comprehensive metabolic panel    Collection Time: 09/27/23 10:18 AM   Result Value Ref Range    Sodium 138 135 - 147 mmol/L    Potassium 3.9 3.5 - 5.3 mmol/L    Chloride 100 96 - 108 mmol/L    CO2 26 21 - 32 mmol/L    ANION GAP 12 mmol/L    BUN 9 5 - 25 mg/dL    Creatinine 0.73 0.60 - 1.30 mg/dL    Glucose, Fasting 101 (H) 65 - 99 mg/dL    Calcium 9.4 8.4 - 10.2 mg/dL    AST 26 13 - 39 U/L    ALT 20 7 - 52 U/L    Alkaline Phosphatase 60 34 - 104 U/L    Total Protein 7.4 6.4 - 8.4 g/dL    Albumin 4.2 3.5 - 5.0 g/dL    Total Bilirubin 1.70 (H) 0.20 - 1.00 mg/dL    eGFR 87 ml/min/1.73sq m   Procalcitonin    Collection Time: 09/27/23 10:18 AM   Result Value Ref Range    Procalcitonin 0.08 <=0.25 ng/ml   C-reactive protein    Collection Time: 09/27/23 10:18 AM   Result Value Ref Range    CRP 30.5 (H) <3.0 mg/L   Uric acid    Collection Time: 09/27/23 10:18 AM   Result Value Ref Range    Uric Acid 4.7 3.5 - 8.5 mg/dL   Lipid panel    Collection Time: 09/27/23 10:18 AM   Result Value Ref Range    Cholesterol 239 (H) See Comment mg/dL    Triglycerides 172 (H) See Comment mg/dL    HDL, Direct 74 >=40 mg/dL    LDL Calculated 131 (H) 0 - 100 mg/dL    Non-HDL-Chol (CHOL-HDL) 165 mg/dl   TSH, 3rd generation    Collection Time: 09/27/23 10:18 AM   Result Value Ref Range    TSH 3RD GENERATON 2.467 0.450 - 4.500 uIU/mL       Laboratory Results: I have personally reviewed the pertinent laboratory results/reports     Radiology/Other Diagnostic Testing Results: I have personally reviewed pertinent reports. XR knee 3 vw right non injury    Result Date: 9/27/2023  RIGHT KNEE INDICATION:   M25.561: Pain in right knee M25.461: Effusion, right knee. COMPARISON:  None VIEWS:  XR KNEE 3 VW RIGHT NON INJURY Images: 3 FINDINGS: There is no acute fracture or dislocation. There is no joint effusion. No significant degenerative changes. No lytic or blastic osseous lesion. Soft tissues are unremarkable. No acute osseous abnormality. Workstation performed: [id number]        Assessment/Plan:  Problem List Items Addressed This Visit    None  Visit Diagnoses     Pain and swelling of right knee    -  Primary    Relevant Medications    predniSONE 20 mg tablet    amoxicillin-clavulanate (AUGMENTIN) 875-125 mg per tablet    doxycycline hyclate (VIBRAMYCIN) 100 mg capsule    Other Relevant Orders    CBC and differential    Comprehensive metabolic panel (Completed)    Procalcitonin (Completed)    Sedimentation rate, automated    C-reactive protein (Completed)    Uric acid (Completed)    Lyme Disease Serology w/Reflex    ASO Screen w/ reflex to Titer    XR knee 3 vw right non injury (Completed)    Poison ivy dermatitis            Advised to go to the [facility] and obtain labs/blood work and x-ray as ordered today. Reviewed- xray shows mild effusion, labs are ok except very high crp, cbc and sed rate need redraw. Tylenol prn for pain. D/d Gouty arthritis, osteoarthritis, cellulitis of knee , poison ivy dermatitis  start taking prednisone 20 mg 1 tablet twice a day with meals for the next 5 days. start taking Augmentin that is amoxicillin potassium clavulanate antibiotic twice a day with meals for 7 days. He will also take doxycycline 100 mg twice a day for 7 days. All of these medications can irritate your stomach cause gastritis. Make sure you take it with food and you can take over-the-counter Nexium if needed. You can also take Tums if needed for gastritis. If your symptoms have not proved in the next 48 hours you might have to go to the emergency room to get the knee drained. Read package inserts for all medications before starting a new medications, call me if you have any questions. Patient was given opportunity to ask questions and all questions were answered.     Disclaimer: Portions of the record may have been created with voice recognition software. Occasional wrong word or "sound a like" substitutions may have occurred due to the inherent limitations of voice recognition software. Read the chart carefully and recognize, using context, where substitutions have occurred. I have used the Epic copy/forward function to compose this note. I have reviewed my current note to ensure it reflects the current patient status, exam, assessment and plan.

## 2023-09-28 ENCOUNTER — HOSPITAL ENCOUNTER (EMERGENCY)
Facility: HOSPITAL | Age: 78
Discharge: HOME | End: 2023-09-28
Attending: INTERNAL MEDICINE
Payer: COMMERCIAL

## 2023-09-28 ENCOUNTER — APPOINTMENT (OUTPATIENT)
Dept: LAB | Facility: HOSPITAL | Age: 78
End: 2023-09-28
Attending: INTERNAL MEDICINE
Payer: COMMERCIAL

## 2023-09-28 VITALS
DIASTOLIC BLOOD PRESSURE: 94 MMHG | SYSTOLIC BLOOD PRESSURE: 173 MMHG | OXYGEN SATURATION: 98 % | HEART RATE: 90 BPM | TEMPERATURE: 98.2 F | RESPIRATION RATE: 20 BRPM

## 2023-09-28 DIAGNOSIS — M70.41 PREPATELLAR BURSITIS OF RIGHT KNEE: Primary | ICD-10-CM

## 2023-09-28 DIAGNOSIS — N52.9 ERECTILE DYSFUNCTION, UNSPECIFIED ERECTILE DYSFUNCTION TYPE: ICD-10-CM

## 2023-09-28 LAB
BASOPHILS # BLD AUTO: 0.04 THOUSANDS/ÂΜL (ref 0–0.1)
BASOPHILS NFR BLD AUTO: 0 % (ref 0–1)
EOSINOPHIL # BLD AUTO: 0.02 THOUSAND/ÂΜL (ref 0–0.61)
EOSINOPHIL NFR BLD AUTO: 0 % (ref 0–6)
ERYTHROCYTE [DISTWIDTH] IN BLOOD BY AUTOMATED COUNT: 13.2 % (ref 11.6–15.1)
ERYTHROCYTE [SEDIMENTATION RATE] IN BLOOD: 34 MM/HOUR (ref 0–19)
HCT VFR BLD AUTO: 43.1 % (ref 36.5–49.3)
HGB BLD-MCNC: 14.9 G/DL (ref 12–17)
IMM GRANULOCYTES # BLD AUTO: 0.06 THOUSAND/UL (ref 0–0.2)
IMM GRANULOCYTES NFR BLD AUTO: 1 % (ref 0–2)
LYMPHOCYTES # BLD AUTO: 1.37 THOUSANDS/ÂΜL (ref 0.6–4.47)
LYMPHOCYTES NFR BLD AUTO: 12 % (ref 14–44)
MCH RBC QN AUTO: 32.3 PG (ref 26.8–34.3)
MCHC RBC AUTO-ENTMCNC: 34.6 G/DL (ref 31.4–37.4)
MCV RBC AUTO: 93 FL (ref 82–98)
MONOCYTES # BLD AUTO: 1.35 THOUSAND/ÂΜL (ref 0.17–1.22)
MONOCYTES NFR BLD AUTO: 11 % (ref 4–12)
NEUTROPHILS # BLD AUTO: 9.11 THOUSANDS/ÂΜL (ref 1.85–7.62)
NEUTS SEG NFR BLD AUTO: 76 % (ref 43–75)
NRBC BLD AUTO-RTO: 0 /100 WBCS
PLATELET # BLD AUTO: 231 THOUSANDS/UL (ref 149–390)
PMV BLD AUTO: 9.4 FL (ref 8.9–12.7)
RBC # BLD AUTO: 4.62 MILLION/UL (ref 3.88–5.62)
WBC # BLD AUTO: 11.95 THOUSAND/UL (ref 4.31–10.16)

## 2023-09-28 PROCEDURE — 84403 ASSAY OF TOTAL TESTOSTERONE: CPT

## 2023-09-28 PROCEDURE — 85652 RBC SED RATE AUTOMATED: CPT

## 2023-09-28 PROCEDURE — 84402 ASSAY OF FREE TESTOSTERONE: CPT

## 2023-09-28 PROCEDURE — 85025 COMPLETE CBC W/AUTO DIFF WBC: CPT

## 2023-09-28 PROCEDURE — 36415 COLL VENOUS BLD VENIPUNCTURE: CPT

## 2023-09-28 PROCEDURE — 99282 EMERGENCY DEPT VISIT SF MDM: CPT

## 2023-09-28 PROCEDURE — 99284 EMERGENCY DEPT VISIT MOD MDM: CPT | Performed by: PHYSICIAN ASSISTANT

## 2023-09-28 RX ORDER — IBUPROFEN 400 MG/1
400 TABLET ORAL ONCE
Status: COMPLETED | OUTPATIENT
Start: 2023-09-28 | End: 2023-09-28

## 2023-09-28 RX ADMIN — IBUPROFEN 400 MG: 400 TABLET, FILM COATED ORAL at 09:52

## 2023-09-28 NOTE — DISCHARGE INSTRUCTIONS
Use Ibuprofen or other anti-inflammatories every 6-8 hours for the next few days for pain and inflammation. Take all oral antibiotics until done. Take all oral steroids until done. Elevate and try not to bend too much    If no improvement follow-up with your doctor/orthopedist in next few days.

## 2023-09-28 NOTE — ED PROVIDER NOTES
History  Chief Complaint   Patient presents with   • Knee Swelling     Reports R knee swelling for 3-4 weeks. Saw PCP yesterday who prescribed medication but he states it has not helped. 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 Pt does admit to kneeling, helping with carpeting, but that was about 2 months ago; then also states prior to symptoms, was outside gardening, kneeling; got a poison ivy rash and then knee symptoms started soon after. Pt seen by PCP yesterday started on: prednisone, Augmentin, Doxy but he's only been taking since yesterday, only 2 doses so far  Pt states paperwork noted, if not better, got to ED "to have it drained", so came into ED to see if we could remove some fluid; states pain not so bad, but can't bend his knee like he wants to and that's limiting his mobility and activities. Pt denies fever, h/o gout, trauma, known tick bites, no bull's eye rash; does have dried "poison ivy rash"              Prior to Admission Medications   Prescriptions Last Dose Informant Patient Reported?  Taking?   acetaminophen (TYLENOL) 650 mg CR tablet   No No   Sig: Take 1 tablet (650 mg total) by mouth every 8 (eight) hours as needed for mild pain or moderate pain   amoxicillin-clavulanate (AUGMENTIN) 875-125 mg per tablet   No No   Sig: Take 1 tablet by mouth every 12 (twelve) hours for 7 days   doxycycline hyclate (VIBRAMYCIN) 100 mg capsule   No No   Sig: Take 1 capsule (100 mg total) by mouth every 12 (twelve) hours for 7 days   predniSONE 20 mg tablet   No No   Sig: Take 1 tablet (20 mg total) by mouth 2 (two) times a day with meals for 5 days      Facility-Administered Medications: None Past Medical History:   Diagnosis Date   • Actinic keratosis     scalp   • Basal cell carcinoma of forehead    • History of transfusion    • HTN (hypertension)    • Infectious viral hepatitis    • Left rotator cuff tear     saw ortho at [facility] 9/30/15   • Malignant melanoma in situ ([address]) 05/09/2019    back; jonathan       Past Surgical History:   Procedure Laterality Date   • COLONOSCOPY  01/08/2020    diverticulosis   • FRACTURE SURGERY  1983    trauma   • SKIN LESION EXCISION         Family History   Problem Relation Age of Onset   • No Known Problems Mother    • No Known Problems Father      I have reviewed and agree with the history as documented. E-Cigarette/Vaping   • E-Cigarette Use Never User      E-Cigarette/Vaping Substances   • Nicotine No    • THC No    • CBD No    • Flavoring No    • Other No    • Unknown No      Social History     Tobacco Use   • Smoking status: Former   • Smokeless tobacco: Never   • Tobacco comments:     quit at age 19   Vaping Use   • Vaping Use: Never used   Substance Use Topics   • Alcohol use: Yes     Comment: occ   • Drug use: Never       Review of Systems   Constitutional: Negative for fever. HENT: Negative for trouble swallowing. Respiratory: Negative for shortness of breath. Musculoskeletal: Positive for gait problem and joint swelling. Skin: Positive for color change and rash. Neurological: Negative for numbness. All other systems reviewed and are negative. Physical Exam  Physical Exam  Vitals and nursing note reviewed. Constitutional:       Appearance: He is well-developed. HENT:      Head: Normocephalic and atraumatic. Right Ear: External ear normal.      Left Ear: External ear normal.      Nose: Nose normal.      Mouth/Throat:      Mouth: Mucous membranes are moist.      Pharynx: Oropharynx is clear.    Eyes:      Conjunctiva/sclera: Conjunctivae normal.   Cardiovascular:      Rate and Rhythm: Normal rate and regular rhythm. Pulmonary:      Effort: Pulmonary effort is normal.      Breath sounds: Normal breath sounds. Abdominal:      General: Bowel sounds are normal.      Palpations: Abdomen is soft. Musculoskeletal:         General: Swelling and tenderness present. No signs of injury. Normal range of motion. Cervical back: Normal range of motion. Comments: + mod prepatella swelling, bogginess, erythema warmth, no joint swelling, decreased ROM to flexion due to swelling   Skin:     General: Skin is warm and dry. Findings: Erythema and rash ( dried, scattered dermatitis to right lower leg) present. Neurological:      Mental Status: He is alert and oriented to person, place, and time.    Psychiatric:         Behavior: Behavior normal.             Vital Signs  ED Triage Vitals   Temperature Pulse Respirations Blood Pressure SpO2   09/28/23 0920 09/28/23 0914 09/28/23 0914 09/28/23 0916 09/28/23 0914   98.2 °F (36.8 °C) 90 20 (!) 173/94 98 %      Temp Source Heart Rate Source Patient Position - Orthostatic VS BP Location FiO2 (%)   09/28/23 0920 09/28/23 0914 09/28/23 0914 09/28/23 0914 --   Oral Monitor Sitting Left arm       Pain Score       09/28/23 0914       No Pain           Vitals:    09/28/23 0914 09/28/23 0916   BP:  (!) 173/94   Pulse: 90    Patient Position - Orthostatic VS: Sitting          Visual Acuity      ED Medications  Medications   ibuprofen (MOTRIN) tablet 400 mg (400 mg Oral Given 9/28/23 3830)       Diagnostic Studies  Results Reviewed     None                 No orders to display              Procedures  Procedures         ED Course                                             Medical Decision Making  Patient with ongoing prepatellar bursitis/possible cellulitis was recently placed on steroids and antibiotics which he only took 2 doses of  Discussed risks and benefits of trying to remove fluid from prepatellar sac but due to the fact that there is redness and warmth and possible infection , would not recommend drainage at this time. Patient to continue antibiotics, follow-up with orthopedist  Doubt lyme and swelling is not in the joint; doubt septic joint due to time frame, no systemic signs, no fever/vomiting    Amount and/or Complexity of Data Reviewed  External Data Reviewed: radiology and notes. Details: Pt had xray and labs yesterday   Xray knee FINDINGS: There is no acute fracture or dislocation. There is no joint effusion. No significant degenerative changes.   No lytic or blastic osseous lesion. Soft tissues are unremarkable. IMPRESSION: No acute osseous abnormality    CRP 30, uric acid 4.7  Discussion of management or test interpretation with external provider(s): Patient seen by ED attending who offered also offered to remove fluid,  patient would prefer to wait at this time    Risk  Prescription drug management. Disposition  Final diagnoses:   Prepatellar bursitis of right knee     Time reflects when diagnosis was documented in both MDM as applicable and the Disposition within this note     Time User Action Codes Description Comment    9/28/2023  9:42 AM Tri Norton Add [M70.41] Prepatellar bursitis of right knee       ED Disposition     ED Disposition   Discharge    Condition   Stable    Date/Time   Thu Sep 28, 2023  9:41 AM    Comment   [address] discharge to home/self care.                Follow-up Information     Follow up With Specialties Details Why Contact Info Additional [address] [facility]   [facility] [id number] [phone] [facility] [address] [place] [address] ([place]), [address] [facility] [phone]          Discharge Medication List as of 9/28/2023  9:48 AM      CONTINUE these medications which have NOT CHANGED    Details   acetaminophen (TYLENOL) 650 mg CR tablet Take 1 tablet (650 mg total) by mouth every 8 (eight) hours as needed for mild pain or moderate pain, Starting Wed 9/27/2023, Normal      amoxicillin-clavulanate (AUGMENTIN) 875-125 mg per tablet Take 1 tablet by mouth every 12 (twelve) hours for 7 days, Starting Wed 9/27/2023, Until Wed 10/4/2023, Normal      doxycycline hyclate (VIBRAMYCIN) 100 mg capsule Take 1 capsule (100 mg total) by mouth every 12 (twelve) hours for 7 days, Starting Wed 9/27/2023, Until Wed 10/4/2023, Normal      predniSONE 20 mg tablet Take 1 tablet (20 mg total) by mouth 2 (two) times a day with meals for 5 days, Starting Wed 9/27/2023, Until Mon 10/2/2023, Normal             No discharge procedures on file.     PDMP Review       Value Time User    PDMP Reviewed  Yes 9/27/2023  9:27 AM Shahrzad Chino MD          ED Provider  Electronically Signed by           Chago Montgomery PA-C  09/28/23 8001

## 2023-09-30 LAB
TESTOST FREE SERPL-MCNC: 6.7 PG/ML (ref 6.6–18.1)
TESTOST SERPL-MCNC: 200 NG/DL (ref 264–916)

## 2023-10-05 ENCOUNTER — NURSE TRIAGE (OUTPATIENT)
Age: 78
End: 2023-10-05

## 2023-10-05 NOTE — TELEPHONE ENCOUNTER
----- Message from Sarah Marr sent at 10/5/2023 11:05 AM EDT -----  Patient called in about extreme pain on his right knee said his knee grew 3x the size. He has had this pain for a little over 2 weeks now, hurts to walk or even stand on that leg. He only wants to be seen by Doctor Suha Carrillo, wondering if he can be seen soon. Thank you.

## 2023-10-05 NOTE — TELEPHONE ENCOUNTER
Pt was treated for redness and swelling of his right knee last week. Pt finished abx yesterday. He is still c/o 7/10 pain in knee. It is still a little red and swollen, but has overall improved. Pt states that it is painful to ambulate, especially going up and down stairs. He only wants to see Dr. Moe Almazan. Appt made in office for tomorrow, ok per [facility] in office. Reason for Disposition  • MODERATE pain (e.g., symptoms interfere with work or school, limping) and present > 3 days    Answer Assessment - Initial Assessment Questions  1. LOCATION and RADIATION: "Where is the pain located?"       Right knee    2. QUALITY: "What does the pain feel like?"  (e.g., sharp, dull, aching, burning)      sharp  3. SEVERITY: "How bad is the pain?" "What does it keep you from doing?"   (Scale 1-10; or mild, moderate, severe)    -  MILD (1-3): doesn't interfere with normal activities     -  MODERATE (4-7): interferes with normal activities (e.g., work or school) or awakens from sleep, limping     -  SEVERE (8-10): excruciating pain, unable to do any normal activities, unable to walk      7/10  4. ONSET: "When did the pain start?" "Does it come and go, or is it there all the time?"      Treated 09/27/2023 by Dr. Alize Caldwell  5. RECURRENT: "Have you had this pain before?" If Yes, ask: "When, and what happened then?"      no  6. SETTING: "Has there been any recent work, exercise or other activity that involved that part of the body?"       Infected dermatitis  7. AGGRAVATING FACTORS: "What makes the knee pain worse?" (e.g., walking, climbing stairs, running)      Walking   8. ASSOCIATED SYMPTOMS: "Is there any swelling or redness of the knee?"      Redness and swelling, less than before     9. OTHER SYMPTOMS: "Do you have any other symptoms?" (e.g., chest pain, difficulty breathing, fever, calf pain)     no  10.  PREGNANCY: "Is there any chance you are pregnant?" "When was your last menstrual period?"        n/a    Protocols used: KNEE PAIN-ADULT-OH

## 2023-10-06 ENCOUNTER — OFFICE VISIT (OUTPATIENT)
Dept: FAMILY MEDICINE CLINIC | Facility: CLINIC | Age: 78
End: 2023-10-06
Payer: COMMERCIAL

## 2023-10-06 ENCOUNTER — HOSPITAL ENCOUNTER (EMERGENCY)
Facility: HOSPITAL | Age: 78
Discharge: HOME | End: 2023-10-06
Attending: EMERGENCY MEDICINE
Payer: COMMERCIAL

## 2023-10-06 VITALS
TEMPERATURE: 98 F | RESPIRATION RATE: 16 BRPM | HEART RATE: 72 BPM | OXYGEN SATURATION: 97 % | SYSTOLIC BLOOD PRESSURE: 109 MMHG | WEIGHT: 162 LBS | BODY MASS INDEX: 26.96 KG/M2 | DIASTOLIC BLOOD PRESSURE: 60 MMHG

## 2023-10-06 VITALS
BODY MASS INDEX: 26.99 KG/M2 | OXYGEN SATURATION: 96 % | TEMPERATURE: 98.3 F | HEIGHT: 65 IN | DIASTOLIC BLOOD PRESSURE: 80 MMHG | HEART RATE: 74 BPM | SYSTOLIC BLOOD PRESSURE: 170 MMHG | WEIGHT: 162 LBS | RESPIRATION RATE: 16 BRPM

## 2023-10-06 DIAGNOSIS — F10.90 HEAVY ALCOHOL CONSUMPTION: ICD-10-CM

## 2023-10-06 DIAGNOSIS — I10 PRIMARY HYPERTENSION: ICD-10-CM

## 2023-10-06 DIAGNOSIS — M25.469 KNEE SWELLING: Primary | ICD-10-CM

## 2023-10-06 DIAGNOSIS — K92.1 BLACK STOOL: ICD-10-CM

## 2023-10-06 DIAGNOSIS — M25.461 SWELLING OF JOINT OF RIGHT KNEE: Primary | ICD-10-CM

## 2023-10-06 LAB
ATRIAL RATE: 67 BPM
P AXIS: 29 DEGREES
PR INTERVAL: 146 MS
QRS AXIS: -17 DEGREES
QRSD INTERVAL: 106 MS
QT INTERVAL: 432 MS
QTC INTERVAL: 456 MS
T WAVE AXIS: 22 DEGREES
VENTRICULAR RATE: 67 BPM

## 2023-10-06 PROCEDURE — 93005 ELECTROCARDIOGRAM TRACING: CPT

## 2023-10-06 PROCEDURE — 99214 OFFICE O/P EST MOD 30 MIN: CPT | Performed by: INTERNAL MEDICINE

## 2023-10-06 PROCEDURE — 99284 EMERGENCY DEPT VISIT MOD MDM: CPT | Performed by: EMERGENCY MEDICINE

## 2023-10-06 PROCEDURE — 99283 EMERGENCY DEPT VISIT LOW MDM: CPT

## 2023-10-06 NOTE — PROGRESS NOTES
Assessment/Plan:Maykel has a red, hot knee on the right with the presence of what I believe is an effusion. He's been on 2 weeks of antibiotics (Augmentin and doxycycline) but knee and leg look no better if not worse today. He says he did take the meds, but they made him sick to his stomach so I don't know how much he actually got of them. When he was in the ER in September his CRP and ESR were elevated, Lyme was negative, and WBC count was slightly high-uric acid level was normal.  I guess joint aspiration was discussed but not done and the thought was that this was more surface cellulitis. I told Jett Zuñiga and his wife Tiki De Oliveira today that I'm concerned with the way the knee looks and that he failed outpatient antibiotic therapy. I also think an orthopod needs to see him, and the joint may need to be aspirated. It's possible he tore something too like a meniscus or something but the redness and heat are no better if not worse and are spreading down the leg. I told them he may need IV antibiotics and blood cultures, although he does not have a fever. Ended up sending him back to the [facility] ER for evaluation and we did call ahead. Jett Zuñiga and his wife are very hesitant on any thoughts of admission as Tiki De Oliveira doesn't drive and they aren't sure about any one available to help them out. Problem List Items Addressed This Visit        Cardiovascular and Mediastinum    HTN (hypertension)     Maykel's bp pretty high today but he is upset with everything            Other    Heavy alcohol consumption   Other Visit Diagnoses     Knee swelling    -  Primary            Subjective:      Patient ID: Cindi Keen is a 78 y.o. male. Jett Zuñiga here with red, hot swollen right knee and leg. Apparently this started at the end of September, and it was thought that he had a cellulitis, and was given 2 antibiotics: Augmentin and Doxycycline, both of which he says he completed but both of which gave him nausea, vomiting and diarrhea.   He was also seen in the ER at the end of September and had an Xray and some labs done-his CRP was 30, ESR 34 and his WBC count somewhat elevated at 11.95. I guess there was discussion in the ER at the time of joint aspiration but didn't happen. The Xray was ok. 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 He also goes jogging with his boxer every day. He used to be a  in [employer]. He has not had a fever with this, and denies a lot of pain. Debbie Garza also has a hx of HTN, and per his wife drinks alcohol excessively. He does not smoke      The following portions of the patient's history were reviewed and updated as appropriate:   Past Medical History:  He has a past medical history of Actinic keratosis, Basal cell carcinoma of forehead, History of transfusion, HTN (hypertension), Infectious viral hepatitis, Left rotator cuff tear, and Malignant melanoma in situ ([address]) (05/09/2019). ,  _______________________________________________________________________  Medical Problems:  does not have any pertinent problems on file.,  _______________________________________________________________________  Past Surgical History:   has a past surgical history that includes Colonoscopy (01/08/2020); Fracture surgery (1983); and Skin lesion excision. ,  _______________________________________________________________________  Family History:  family history includes No Known Problems in his father and mother.,  _______________________________________________________________________  Social History:   reports that he has quit smoking. He has never used smokeless tobacco. He reports current alcohol use. He reports that he does not use drugs. ,  _______________________________________________________________________  Allergies:  has No Known Allergies. .  _______________________________________________________________________  No current outpatient medications on file. No current facility-administered medications for this visit.     _______________________________________________________________________  Review of Systems   Constitutional: Negative. Negative for fever. HENT: Negative. Respiratory: Negative. Cardiovascular: Negative. Musculoskeletal: Positive for joint swelling. Skin: Positive for color change. Right knee swelling, feels like an effusion, hot to the touch   Neurological: Negative. Objective:  Vitals:    10/06/23 1608 10/06/23 1729   BP: 160/80 170/80   BP Location: Left arm    Patient Position: Sitting    Cuff Size: Standard    Pulse: 74    Resp: 16    Temp: 98.3 °F (36.8 °C)    TempSrc: Tympanic    SpO2: 96%    Weight: 73.5 kg (162 lb)    Height: 5' 5" (1.651 m)      Body mass index is 26.96 kg/m². Physical Exam  Constitutional:       Appearance: Normal appearance. HENT:      Head: Normocephalic and atraumatic. Right Ear: External ear normal.      Left Ear: External ear normal.      Mouth/Throat:      Mouth: Mucous membranes are moist.   Eyes:      Extraocular Movements: Extraocular movements intact. Cardiovascular:      Rate and Rhythm: Normal rate and regular rhythm. Pulmonary:      Effort: Pulmonary effort is normal.      Breath sounds: Normal breath sounds. Abdominal:      Palpations: Abdomen is soft. Musculoskeletal:         General: Swelling present. Cervical back: Normal range of motion and neck supple. Skin:     General: Skin is warm. Capillary Refill: Capillary refill takes less than 2 seconds. Findings: Erythema present. Comments: Red hot swollen right knee   Neurological:      General: No focal deficit present. Mental Status: He is alert and oriented to person, place, and time. Mental status is at baseline.    Psychiatric:         Behavior: Behavior normal.

## 2023-10-06 NOTE — ED NOTES
Provider made aware of pts statement that he has had black liquid stool for 2 weeks. EKG done and then pt wanted to know how long he would be here. When informed it could be a couple hours or more, he insisted he be removed from the cardiac monitor and stated he was leaving.   Provider made aware and at bedside     Latonya Vicente, [place]  10/06/23 3408

## 2023-10-06 NOTE — ED PROVIDER NOTES
History  Chief Complaint   Patient presents with   • Knee Swelling     Pt states knee swelling x 2 weeks. Reports "black liquid" stool x2 weeks after taking Amoxicillin and Doxycycline. 49-year-old male with history of hypertension, melanoma who presents for evaluation of right knee swelling and redness. Patient was previously evaluated in the emergency department on 9/28/2023 for the same. He was diagnosed with bursitis at that time and placed on anti-inflammatories. He had already been evaluated by his primary care physician prior to this visit and was initiated on antibiotics. Patient followed up with his PCP today and they were concerned for failed outpatient management as he still has some redness and swelling to the knee. He was subsequently referred to the ED for further evaluation. Patient notes that he has no pain in the right knee. He is able to walk 4 to 5 miles per day without any difficulty. He feels as though the redness and swelling has improved since his last visit here. He denies any fevers or other systemic symptoms. He finished the course of antibiotics previously prescribed. He notes that he has been using ice on the area which does significantly improve the redness and swelling as well. Patient also notes that he has had black liquid stool for approximately 1 week. He states that this initiated after he started taking the prescribed antibiotics and resolved after completion of the antibiotics about 1 week ago. His stool has been completely normal since that time. He denies any lightheadedness, shortness of breath, abdominal pain, nausea, vomiting.           None       Past Medical History:   Diagnosis Date   • Actinic keratosis     scalp   • Basal cell carcinoma of forehead    • History of transfusion    • HTN (hypertension)    • Infectious viral hepatitis    • Left rotator cuff tear     saw ortho at [facility] 9/30/15   • Malignant melanoma in situ ([address]) 05/09/2019 back; zaladonis       Past Surgical History:   Procedure Laterality Date   • COLONOSCOPY  01/08/2020    diverticulosis   • FRACTURE SURGERY  1983    trauma   • SKIN LESION EXCISION         Family History   Problem Relation Age of Onset   • No Known Problems Mother    • No Known Problems Father      I have reviewed and agree with the history as documented. E-Cigarette/Vaping   • E-Cigarette Use Never User      E-Cigarette/Vaping Substances   • Nicotine No    • THC No    • CBD No    • Flavoring No    • Other No    • Unknown No      Social History     Tobacco Use   • Smoking status: Former   • Smokeless tobacco: Never   • Tobacco comments:     quit at age 19   Vaping Use   • Vaping Use: Never used   Substance Use Topics   • Alcohol use: Yes     Comment: glass of wine with meals   • Drug use: Never       Review of Systems   Constitutional: Negative for chills and fever. Respiratory: Negative for shortness of breath. Cardiovascular: Negative for chest pain. Gastrointestinal: Positive for blood in stool (black stool, resolved) and diarrhea (resolved). Negative for abdominal pain, nausea and vomiting. Genitourinary: Negative for flank pain. Musculoskeletal: Positive for joint swelling. Negative for arthralgias. Skin: Negative for rash. Neurological: Negative for weakness and light-headedness. All other systems reviewed and are negative. Physical Exam  Physical Exam  Vitals and nursing note reviewed. Constitutional:       General: He is not in acute distress. Appearance: He is not ill-appearing. HENT:      Head: Normocephalic and atraumatic. Nose: Nose normal.      Mouth/Throat:      Mouth: Mucous membranes are moist.   Eyes:      Conjunctiva/sclera: Conjunctivae normal.   Cardiovascular:      Rate and Rhythm: Normal rate and regular rhythm. Heart sounds: No murmur heard. No friction rub. No gallop. Comments: 2+ right DP pulse.   Pulmonary:      Effort: Pulmonary effort is normal.      Breath sounds: Normal breath sounds. No wheezing, rhonchi or rales. Abdominal:      General: There is no distension. Palpations: Abdomen is soft. Tenderness: There is no abdominal tenderness. Musculoskeletal:         General: Normal range of motion. Cervical back: Normal range of motion and neck supple. Comments: Mild prepatellar swelling noted to the right knee. There is no tenderness to the area. Range of motion is intact completely nonpainful. There is 1+ pitting edema noted to the right lower leg. No edema noted to the left lower leg. Skin:     General: Skin is warm and dry. Comments: There is an area of erythema overlying the right knee joint. The area is warm to touch. Neurological:      General: No focal deficit present. Mental Status: He is alert and oriented to person, place, and time. Comments: Motor and sensation intact of the right lower extremity.    Psychiatric:         Behavior: Behavior normal.         Vital Signs  ED Triage Vitals [10/06/23 1804]   Temperature Pulse Respirations Blood Pressure SpO2   98 °F (36.7 °C) 72 16 109/60 97 %      Temp Source Heart Rate Source Patient Position - Orthostatic VS BP Location FiO2 (%)   Oral Monitor Sitting Right arm --      Pain Score       No Pain           Vitals:    10/06/23 1804   BP: 109/60   Pulse: 72   Patient Position - Orthostatic VS: Sitting         Visual Acuity      ED Medications  Medications - No data to display    Diagnostic Studies  Results Reviewed     None                 No orders to display              Procedures  Procedures         ED Course  ED Course as of 10/06/23 1913   Fri Oct 06, 2023   4450 Procedure Note: EKG  Date/Time: 10/06/23 6:09 PM   Interpreted by: Chapo Bennett  Indications / Diagnosis: knee pain  ECG reviewed by me, the ED Provider: yes   The EKG demonstrates:  Rhythm: rate 67, normal sinus  Intervals: normal intervals  Axis: normal axis  QRS/Blocks: normal QRS  ST Changes: No acute ST Changes, no STD/MYLES. SBIRT 22yo+    Flowsheet Row Most Recent Value   Initial Alcohol Screen: US AUDIT-C     1. How often do you have a drink containing alcohol? 0 Filed at: 10/06/2023 1804   2. How many drinks containing alcohol do you have on a typical day you are drinking? 0 Filed at: 10/06/2023 1804   3a. Male UNDER 65: How often do you have five or more drinks on one occasion? 0 Filed at: 10/06/2023 1804   3b. FEMALE Any Age, or MALE 65+: How often do you have 4 or more drinks on one occassion? 0 Filed at: 10/06/2023 1804   Audit-C Score 0 Filed at: 10/06/2023 9300   MARV: How many times in the past year have you. .. Used an illegal drug or used a prescription medication for non-medical reasons? Never Filed at: 10/06/2023 1804                    Medical Decision Making  78-year-old male presenting for evaluation of swelling and redness to his right knee. This issue has been going on for several weeks at this point. Patient clinically does not examine as though he has a septic arthritis given he has no pain and has not had any fevers. He has completed a course of antibiotics. Comparing previous images, patient's knee actually appears improved compared to previous. I continue to suspect that this is an element of bursitis that is gradually resolving. DVT is also on the differential given his swelling in the lower leg now as well. Patient also endorsing dark stools which initiated and resolved with antibiotic use. Suspect this is related to the medications, however, advised patient that I would recommend checking a hemoglobin level to ensure this is stable. After my evaluation, patient is adamant that he cannot stay in the emergency department tonight and that he would like to return home. He states that he can return tomorrow morning for further evaluation of his symptoms.   Risks of discharge were discussed with patient and he acknowledges these risks. AMA form was completed. Patient plans to return to the ED tomorrow morning. Black stool: acute illness or injury  Swelling of joint of right knee: acute illness or injury      Disposition  Final diagnoses:   Swelling of joint of right knee   Black stool     Time reflects when diagnosis was documented in both MDM as applicable and the Disposition within this note     Time User Action Codes Description Comment    10/6/2023  6:27 PM Skipper Narinder Add [M25.461] Swelling of joint of right knee     10/6/2023  6:27 PM Skipper Narinder Add [K92.1] Black stool       ED Disposition     ED Disposition   AMA    Condition   --    Date/Time   Fri Oct 6, 2023  6:27 PM    Comment   Date: 10/6/2023  Patient: Natalia Eckert  Admitted: 10/6/2023  5:56 PM  Attending Provider: Yuriy Brunner MD    Natalia Eckert or his authorized caregiver has made the decision for the patient to leave the emergency department against the advice o f his attending physician. He or his authorized caregiver has been informed and understands the inherent risks, including death, coma, permanent disability, pulmonary embolism, permanent organ dysfunction or failure, sepsis. He or his authorized car egiver has decided to accept the responsibility for this decision. Natalia Eckert and all necessary parties have been advised that he may return for further evaluation or treatment. His condition at time of discharge was stable. Natalia Eckert had c urrent vital signs as follows:  /60 (BP Location: Right arm)   Pulse 72   Temp 98 °F (36.7 °C) (Oral)   Resp 16   Wt 73.5 kg (162 lb)            Follow-up Information    None         There are no discharge medications for this patient. No discharge procedures on file.     PDMP Review       Value Time User    PDMP Reviewed  Yes 9/27/2023  9:27 AM Rahul Sherwood MD          ED Provider  Electronically Signed by           Yuriy Brunner MD  10/06/23 0111

## 2023-10-07 ENCOUNTER — HOSPITAL ENCOUNTER (EMERGENCY)
Facility: HOSPITAL | Age: 78
Discharge: HOME | End: 2023-10-07
Attending: INTERNAL MEDICINE
Payer: COMMERCIAL

## 2023-10-07 ENCOUNTER — APPOINTMENT (EMERGENCY)
Dept: VASCULAR ULTRASOUND | Facility: HOSPITAL | Age: 78
End: 2023-10-07
Payer: COMMERCIAL

## 2023-10-07 ENCOUNTER — APPOINTMENT (EMERGENCY)
Dept: CT IMAGING | Facility: HOSPITAL | Age: 78
End: 2023-10-07
Payer: COMMERCIAL

## 2023-10-07 VITALS
TEMPERATURE: 97.9 F | OXYGEN SATURATION: 97 % | SYSTOLIC BLOOD PRESSURE: 148 MMHG | HEART RATE: 71 BPM | RESPIRATION RATE: 16 BRPM | DIASTOLIC BLOOD PRESSURE: 69 MMHG

## 2023-10-07 DIAGNOSIS — M70.41 PREPATELLAR BURSITIS OF RIGHT KNEE: Primary | ICD-10-CM

## 2023-10-07 LAB
ALBUMIN SERPL BCP-MCNC: 3.6 G/DL (ref 3.5–5)
ALP SERPL-CCNC: 53 U/L (ref 34–104)
ALT SERPL W P-5'-P-CCNC: 17 U/L (ref 7–52)
ANION GAP SERPL CALCULATED.3IONS-SCNC: 8 MMOL/L
AST SERPL W P-5'-P-CCNC: 24 U/L (ref 13–39)
BASOPHILS # BLD AUTO: 0.04 THOUSANDS/ÂΜL (ref 0–0.1)
BASOPHILS NFR BLD AUTO: 1 % (ref 0–1)
BILIRUB SERPL-MCNC: 1.28 MG/DL (ref 0.2–1)
BUN SERPL-MCNC: 12 MG/DL (ref 5–25)
CALCIUM SERPL-MCNC: 8.6 MG/DL (ref 8.4–10.2)
CHLORIDE SERPL-SCNC: 104 MMOL/L (ref 96–108)
CO2 SERPL-SCNC: 24 MMOL/L (ref 21–32)
CREAT SERPL-MCNC: 0.65 MG/DL (ref 0.6–1.3)
CRP SERPL QL: 44.1 MG/L
EOSINOPHIL # BLD AUTO: 0.23 THOUSAND/ÂΜL (ref 0–0.61)
EOSINOPHIL NFR BLD AUTO: 3 % (ref 0–6)
ERYTHROCYTE [DISTWIDTH] IN BLOOD BY AUTOMATED COUNT: 13.7 % (ref 11.6–15.1)
GFR SERPL CREATININE-BSD FRML MDRD: 91 ML/MIN/1.73SQ M
GLUCOSE SERPL-MCNC: 94 MG/DL (ref 65–140)
HCT VFR BLD AUTO: 38.8 % (ref 36.5–49.3)
HGB BLD-MCNC: 13.5 G/DL (ref 12–17)
IMM GRANULOCYTES # BLD AUTO: 0.06 THOUSAND/UL (ref 0–0.2)
IMM GRANULOCYTES NFR BLD AUTO: 1 % (ref 0–2)
LYMPHOCYTES # BLD AUTO: 1.65 THOUSANDS/ÂΜL (ref 0.6–4.47)
LYMPHOCYTES NFR BLD AUTO: 21 % (ref 14–44)
MCH RBC QN AUTO: 32 PG (ref 26.8–34.3)
MCHC RBC AUTO-ENTMCNC: 34.8 G/DL (ref 31.4–37.4)
MCV RBC AUTO: 92 FL (ref 82–98)
MONOCYTES # BLD AUTO: 1.63 THOUSAND/ÂΜL (ref 0.17–1.22)
MONOCYTES NFR BLD AUTO: 20 % (ref 4–12)
NEUTROPHILS # BLD AUTO: 4.43 THOUSANDS/ÂΜL (ref 1.85–7.62)
NEUTS SEG NFR BLD AUTO: 54 % (ref 43–75)
NRBC BLD AUTO-RTO: 0 /100 WBCS
PLATELET # BLD AUTO: 258 THOUSANDS/UL (ref 149–390)
PMV BLD AUTO: 9.2 FL (ref 8.9–12.7)
POTASSIUM SERPL-SCNC: 4.1 MMOL/L (ref 3.5–5.3)
PROT SERPL-MCNC: 6.5 G/DL (ref 6.4–8.4)
RBC # BLD AUTO: 4.22 MILLION/UL (ref 3.88–5.62)
SODIUM SERPL-SCNC: 136 MMOL/L (ref 135–147)
WBC # BLD AUTO: 8.04 THOUSAND/UL (ref 4.31–10.16)

## 2023-10-07 PROCEDURE — 86140 C-REACTIVE PROTEIN: CPT | Performed by: PHYSICIAN ASSISTANT

## 2023-10-07 PROCEDURE — 80053 COMPREHEN METABOLIC PANEL: CPT | Performed by: PHYSICIAN ASSISTANT

## 2023-10-07 PROCEDURE — 85025 COMPLETE CBC W/AUTO DIFF WBC: CPT | Performed by: PHYSICIAN ASSISTANT

## 2023-10-07 PROCEDURE — G1004 CDSM NDSC: HCPCS

## 2023-10-07 PROCEDURE — 99285 EMERGENCY DEPT VISIT HI MDM: CPT | Performed by: PHYSICIAN ASSISTANT

## 2023-10-07 PROCEDURE — 36415 COLL VENOUS BLD VENIPUNCTURE: CPT | Performed by: PHYSICIAN ASSISTANT

## 2023-10-07 PROCEDURE — 93971 EXTREMITY STUDY: CPT

## 2023-10-07 PROCEDURE — 93971 EXTREMITY STUDY: CPT | Performed by: SURGERY

## 2023-10-07 PROCEDURE — 99284 EMERGENCY DEPT VISIT MOD MDM: CPT

## 2023-10-07 PROCEDURE — 73701 CT LOWER EXTREMITY W/DYE: CPT

## 2023-10-07 RX ORDER — IBUPROFEN 600 MG/1
600 TABLET ORAL ONCE
Status: COMPLETED | OUTPATIENT
Start: 2023-10-07 | End: 2023-10-07

## 2023-10-07 RX ORDER — CIPROFLOXACIN 500 MG/1
500 TABLET, FILM COATED ORAL 2 TIMES DAILY
Qty: 14 TABLET | Refills: 0 | Status: SHIPPED | OUTPATIENT
Start: 2023-10-07 | End: 2023-10-14

## 2023-10-07 RX ORDER — SULFAMETHOXAZOLE AND TRIMETHOPRIM 800; 160 MG/1; MG/1
1 TABLET ORAL 2 TIMES DAILY
Qty: 14 TABLET | Refills: 0 | Status: SHIPPED | OUTPATIENT
Start: 2023-10-07 | End: 2023-10-14

## 2023-10-07 RX ORDER — IBUPROFEN 600 MG/1
600 TABLET ORAL EVERY 6 HOURS PRN
Qty: 20 TABLET | Refills: 0 | Status: SHIPPED | OUTPATIENT
Start: 2023-10-07

## 2023-10-07 RX ORDER — SULFAMETHOXAZOLE AND TRIMETHOPRIM 800; 160 MG/1; MG/1
1 TABLET ORAL 2 TIMES DAILY
Qty: 14 TABLET | Refills: 0 | Status: SHIPPED | OUTPATIENT
Start: 2023-10-07 | End: 2023-10-07 | Stop reason: SDUPTHER

## 2023-10-07 RX ADMIN — IOHEXOL 100 ML: 350 INJECTION, SOLUTION INTRAVENOUS at 10:24

## 2023-10-07 RX ADMIN — IBUPROFEN 600 MG: 600 TABLET, FILM COATED ORAL at 12:05

## 2023-10-07 NOTE — ED PROVIDER NOTES
History  Chief Complaint   Patient presents with   • Leg Swelling     Patient presents with right lower leg swelling     Past Medical History: Actinic keratosis, Basal cell carcinoma of forehead, Anemia-H/o transfusion, HTN, Infectious viral hepatitis, Left rotator cuff tear, Malignant melanoma in situ - 05/09/2019-back; Past Surgical History: diverticulosis, SKIN LESION EXCISION                         Pt presents to ED c/o constant, persistent, R knee pain/redness/swelling for 2 weeks. Pt does admit to kneeling in garden while doing yard work-and symptoms started soon after that, helping with carpeting, but that was about 2 months ago  Pt seen by PCP given: prednisone, Augmentin, Doxy (9/27) which he finished, symptoms improved, but still persist.  Pt c/o pain to touch and bending, but still able to ambulate-went to walk with his dog this am; came to be seen by orthopedist/have it drained, r/o septic joint  Pt denies fever, h/o gout, trauma, known tick bites, no bull's eye rash.             None       Past Medical History:   Diagnosis Date   • Actinic keratosis     scalp   • Basal cell carcinoma of forehead    • History of transfusion    • HTN (hypertension)    • Infectious viral hepatitis    • Left rotator cuff tear     saw ortho at [facility] 9/30/15   • Malignant melanoma in situ ([address]) 05/09/2019    back; jonathan       Past Surgical History:   Procedure Laterality Date   • COLONOSCOPY  01/08/2020    diverticulosis   • FRACTURE SURGERY  1983    trauma   • SKIN LESION EXCISION         Family History   Problem Relation Age of Onset   • No Known Problems Mother    • No Known Problems Father      I have reviewed and agree with the history as documented.     E-Cigarette/Vaping   • E-Cigarette Use Never User      E-Cigarette/Vaping Substances   • Nicotine No    • THC No    • CBD No    • Flavoring No    • Other No    • Unknown No      Social History     Tobacco Use   • Smoking status: Former   • Smokeless tobacco: Never   • Tobacco comments:     quit at age 19   Vaping Use   • Vaping Use: Never used   Substance Use Topics   • Alcohol use: Yes     Comment: glass of wine with meals   • Drug use: Never       Review of Systems   Constitutional: Negative for fever. Respiratory: Negative for cough and shortness of breath. Cardiovascular: Positive for leg swelling. Negative for chest pain. Gastrointestinal: Negative for abdominal pain, diarrhea and vomiting. Musculoskeletal: Positive for arthralgias and joint swelling. Negative for myalgias. Skin: Positive for color change. Negative for pallor and wound. Neurological: Negative for weakness and numbness. All other systems reviewed and are negative. Physical Exam  Physical Exam  Vitals and nursing note reviewed. Constitutional:       General: He is not in acute distress. Appearance: He is well-developed. HENT:      Head: Normocephalic and atraumatic. Right Ear: External ear normal.      Left Ear: External ear normal.      Nose: Nose normal.      Mouth/Throat:      Mouth: Mucous membranes are moist.      Pharynx: Oropharynx is clear. Eyes:      Conjunctiva/sclera: Conjunctivae normal.   Cardiovascular:      Rate and Rhythm: Normal rate. Pulmonary:      Effort: Pulmonary effort is normal.   Musculoskeletal:         General: Swelling and tenderness present. Cervical back: Normal range of motion. Right lower leg: Edema present. Comments: + mild tenderness/swelling/erythema noted to pre-patella region with mild-mod slight pitting edema distally to RLE/calf  Slight decrease flexion due to pain/swelling. NO obvious joint effusion   Skin:     General: Skin is warm and dry. Findings: Erythema present. Neurological:      General: No focal deficit present. Mental Status: He is alert.       Gait: Gait normal.   Psychiatric:         Behavior: Behavior normal.             Vital Signs  ED Triage Vitals [10/07/23 0947] Temperature Pulse Respirations Blood Pressure SpO2   97.9 °F (36.6 °C) 71 16 148/69 97 %      Temp Source Heart Rate Source Patient Position - Orthostatic VS BP Location FiO2 (%)   Oral Monitor Sitting Left arm --      Pain Score       No Pain           Vitals:    10/07/23 0947   BP: 148/69   Pulse: 71   Patient Position - Orthostatic VS: Sitting         Visual Acuity      ED Medications  Medications   ibuprofen (MOTRIN) tablet 600 mg (has no administration in time range)   iohexol (OMNIPAQUE) 350 MG/ML injection (SINGLE-DOSE) 100 mL (100 mL Intravenous Given 10/7/23 1024)       Diagnostic Studies  Results Reviewed     Procedure Component Value Units Date/Time    Comprehensive metabolic panel [[id number]]  (Abnormal) Collected: 10/07/23 0953    Lab Status: Final result Specimen: Blood from Arm, Right Updated: 10/07/23 1014     Sodium 136 mmol/L      Potassium 4.1 mmol/L      Chloride 104 mmol/L      CO2 24 mmol/L      ANION GAP 8 mmol/L      BUN 12 mg/dL      Creatinine 0.65 mg/dL      Glucose 94 mg/dL      Calcium 8.6 mg/dL      AST 24 U/L      ALT 17 U/L      Alkaline Phosphatase 53 U/L      Total Protein 6.5 g/dL      Albumin 3.6 g/dL      Total Bilirubin 1.28 mg/dL      eGFR 91 ml/min/1.73sq m     Narrative:      [place] guidelines for Chronic Kidney Disease (CKD):   •  Stage 1 with normal or high GFR (GFR > 90 mL/min/1.73 square meters)  •  Stage 2 Mild CKD (GFR = 60-89 mL/min/1.73 square meters)  •  Stage 3A Moderate CKD (GFR = 45-59 mL/min/1.73 square meters)  •  Stage 3B Moderate CKD (GFR = 30-44 mL/min/1.73 square meters)  •  Stage 4 Severe CKD (GFR = 15-29 mL/min/1.73 square meters)  •  Stage 5 End Stage CKD (GFR <15 mL/min/1.73 square meters)  Note: GFR calculation is accurate only with a steady state creatinine    C-reactive protein [[id number]]  (Abnormal) Collected: 10/07/23 0953    Lab Status: Final result Specimen: Blood from Arm, Right Updated: 10/07/23 1014     CRP 44.1 mg/L     CBC and differential [[id number]]  (Abnormal) Collected: 10/07/23 0953    Lab Status: Final result Specimen: Blood from Arm, Right Updated: 10/07/23 0958     WBC 8.04 Thousand/uL      RBC 4.22 Million/uL      Hemoglobin 13.5 g/dL      Hematocrit 38.8 %      MCV 92 fL      MCH 32.0 pg      MCHC 34.8 g/dL      RDW 13.7 %      MPV 9.2 fL      Platelets 518 Thousands/uL      nRBC 0 /100 WBCs      Neutrophils Relative 54 %      Immat GRANS % 1 %      Lymphocytes Relative 21 %      Monocytes Relative 20 %      Eosinophils Relative 3 %      Basophils Relative 1 %      Neutrophils Absolute 4.43 Thousands/µL      Immature Grans Absolute 0.06 Thousand/uL      Lymphocytes Absolute 1.65 Thousands/µL      Monocytes Absolute 1.63 Thousand/µL      Eosinophils Absolute 0.23 Thousand/µL      Basophils Absolute 0.04 Thousands/µL                  CT lower extremity w contrast right   Final Result by Jayro Carolina MD (10/07 1136)      Findings compatible with the given history of prepatellar bursitis. Imaging cannot exclude infected bursitis. Cellulitis. No acute osseous pathology. Workstation performed: [id number]         VAS lower limb venous duplex study, unilateral/limited    (Results Pending)              Procedures  Procedures         ED Course  ED Course as of 10/07/23 1211   Sat Oct 07, 2023   1047 Cbc unremarkable   1047 Cmp unremarkable   1111 VAS study neg for DVT per tech who performed test                               SBIRT 22yo+    Flowsheet Row Most Recent Value   Initial Alcohol Screen: US AUDIT-C     1. How often do you have a drink containing alcohol? 0 Filed at: 10/07/2023 0947   2. How many drinks containing alcohol do you have on a typical day you are drinking? 0 Filed at: 10/07/2023 0947   3a. Male UNDER 65: How often do you have five or more drinks on one occasion?  0 Filed at: 10/07/2023 0947   Audit-C Score 0 Filed at: 10/07/2023 5428   MARV: How many times in the past year have you... Used an illegal drug or used a prescription medication for non-medical reasons? Never Filed at: 10/07/2023 6422                    Medical Decision Making  Pt here with persistent prepatellar pain/redness/swelling  DDX: Prepatellar bursitis, cellulitis, laboratory process, septic joint, abscess, DVT, electrolyte abnormalities, among others  Slightly increasing CRP otherwise labs are unremarkable. Doppler shows no DVT. CAT scan shows no definitive abscess, but persistent prepatellar fluid, bursitis. Attempted to aspirate area unsuccessful, no fluid drained  Doubt septic joint, as symptoms are in the prepatellar area. Will contact Ortho for treatment options  Discussed with ortho, see below, stable for dc on antibx, FU with ortho    Amount and/or Complexity of Data Reviewed  External Data Reviewed: labs and notes. Labs: ordered. Decision-making details documented in ED Course. Radiology: ordered. Decision-making details documented in ED Course. Discussion of management or test interpretation with external provider(s): TT with ortho on call: Dr. Jorge Griffith, ". ...looks like persistent prepatellar bursitis - likely septic. He needs antibiotics - I would give Bactrim DS and Cipro if he is able to go home. Follow up with orthopaedic surgery next week. Risk  OTC drugs. Prescription drug management. Disposition  Final diagnoses:   Prepatellar bursitis of right knee     Time reflects when diagnosis was documented in both MDM as applicable and the Disposition within this note     Time User Action Codes Description Comment    10/7/2023 11:58 AM Colten Nicole Add [M70.41] Prepatellar bursitis of right knee       ED Disposition     ED Disposition   Discharge    Condition   Stable    Date/Time   Sat Oct 7, 2023 11:58 AM    Comment   [address] discharge to home/self care.                Follow-up Information     Follow up With Specialties Details Why Contact Info Additional Information    Beck Naidu Sally Velazco MD Orthopedic Surgery   [address] [place] [id number] [address] [facility]   [place] [address] [facility] [address] [facility] [address] [facility] [address] [facility] [phone]          Patient's Medications   Discharge Prescriptions    CIPROFLOXACIN (CIPRO) 500 MG TABLET    Take 1 tablet (500 mg total) by mouth 2 (two) times a day for 7 days       Start Date: 10/7/2023 End Date: 10/14/2023       Order Dose: 500 mg       Quantity: 14 tablet    Refills: 0    IBUPROFEN (MOTRIN) 600 MG TABLET    Take 1 tablet (600 mg total) by mouth every 6 (six) hours as needed for mild pain       Start Date: 10/7/2023 End Date: --       Order Dose: 600 mg       Quantity: 20 tablet    Refills: 0    SULFAMETHOXAZOLE-TRIMETHOPRIM (BACTRIM DS) 800-160 MG PER TABLET    Take 1 tablet by mouth 2 (two) times a day for 7 days smx-tmp DS (BACTRIM) 800-160 mg tabs (1tab q12 D10)       Start Date: 10/7/2023 End Date: 10/14/2023       Order Dose: 1 tablet       Quantity: 14 tablet    Refills: 0       No discharge procedures on file.     PDMP Review       Value Time User    PDMP Reviewed  Yes 9/27/2023  9:27 AM Bethena Harada, MD          ED Provider  Electronically Signed by           Beverly Cooper PA-C  10/07/23 8727

## 2023-10-07 NOTE — ED NOTES
Vascular tech to arrive in 30-40 minutes to perform study, provider notified     Thomas Saez RN  10/07/23 0432

## 2023-10-07 NOTE — DISCHARGE INSTRUCTIONS
Use anti-inflammatories: Ibuprofen,Motrin, advil at least twice a day for pain, inflammation. Take all oral antibiotics until done. Rest and elevate leg.     Follow-up with orthopedist, call office on Monday to be seen end of next week

## 2023-10-09 PROCEDURE — 93010 ELECTROCARDIOGRAM REPORT: CPT | Performed by: INTERNAL MEDICINE

## 2023-10-10 ENCOUNTER — TELEPHONE (OUTPATIENT)
Age: 78
End: 2023-10-10

## 2023-10-20 ENCOUNTER — OFFICE VISIT (OUTPATIENT)
Dept: OBGYN CLINIC | Facility: CLINIC | Age: 78
End: 2023-10-20

## 2023-10-20 ENCOUNTER — APPOINTMENT (OUTPATIENT)
Dept: RADIOLOGY | Age: 78
End: 2023-10-20
Payer: COMMERCIAL

## 2023-10-20 VITALS
BODY MASS INDEX: 25.99 KG/M2 | WEIGHT: 156 LBS | DIASTOLIC BLOOD PRESSURE: 74 MMHG | HEIGHT: 65 IN | HEART RATE: 60 BPM | SYSTOLIC BLOOD PRESSURE: 115 MMHG

## 2023-10-20 DIAGNOSIS — M25.561 RIGHT KNEE PAIN, UNSPECIFIED CHRONICITY: ICD-10-CM

## 2023-10-20 DIAGNOSIS — M25.562 LEFT KNEE PAIN, UNSPECIFIED CHRONICITY: ICD-10-CM

## 2023-10-20 DIAGNOSIS — M25.561 RIGHT KNEE PAIN, UNSPECIFIED CHRONICITY: Primary | ICD-10-CM

## 2023-10-20 PROCEDURE — 73562 X-RAY EXAM OF KNEE 3: CPT

## 2023-10-20 RX ORDER — RIVAROXABAN 20 MG/1
20 TABLET, FILM COATED ORAL
COMMUNITY

## 2023-10-20 NOTE — PROGRESS NOTES
CHIEF COMPLAINT/REASON FOR VISIT  Chief Complaint   Patient presents with    Right Knee - Pain        HISTORY OF PRESENT ILLNESS  Louisa Brandon is a 78 y.o. male who presents for evaluation of his right knee. Patient was at the ER a couple weeks prior due to some right knee swelling. He said afterwards he was told to follow-up with an orthopedic. Patient currently denies any pain in his knee but does say he has a little bit of swelling around the prepatellar bursa. Patient said he is very active walking his dog 2 to 3 miles a day and he works as a  for [employer]. REVIEW OF SYSTEMS  Review of systems was performed and, outside that mentioned in the HPI, it was negative for symptomology related to the integumentary, hematologic, immunologic, allergic, neurologic, cardiovascular, respiratory, GI or  systems.     MEDICAL HISTORY  Patient Active Problem List   Diagnosis    Hearing loss    HTN (hypertension)    Heavy alcohol consumption       SURGICAL HISTORY  Past Surgical History:   Procedure Laterality Date    COLONOSCOPY  01/08/2020    diverticulosis    FRACTURE SURGERY  1983    trauma    SKIN LESION EXCISION         CURRENT MEDICATIONS    Current Outpatient Medications:     metoprolol tartrate (LOPRESSOR) 25 mg tablet, Take 12.5 mg by mouth 2 (two) times a day, Disp: , Rfl:     Xarelto 20 MG tablet, Take 20 mg by mouth daily with dinner, Disp: , Rfl:     ibuprofen (MOTRIN) 600 mg tablet, Take 1 tablet (600 mg total) by mouth every 6 (six) hours as needed for mild pain (Patient not taking: Reported on 10/20/2023), Disp: 20 tablet, Rfl: 0    SOCIAL HISTORY  Social History     Socioeconomic History    Marital status: Single     Spouse name: Not on file    Number of children: Not on file    Years of education: Not on file    Highest education level: Not on file   Occupational History    Not on file   Tobacco Use    Smoking status: Former    Smokeless tobacco: Never    Tobacco comments: quit at age 19   Vaping Use    Vaping Use: Never used   Substance and Sexual Activity    Alcohol use: Yes     Comment: glass of wine with meals    Drug use: Never    Sexual activity: Not Currently   Other Topics Concern    Not on file   Social History Narrative    Most recent tobacco use screenin2019    Occupation: Retired    Marital status:     Sexual orientation: Heterosexual    Exercise level: Occasional    Runs 5 miles daily with dog    Diet: Regular    Has smoked since age: teenager    Alcohol intake: Occasional socially    Caffeine intake: Occasional    coffee every am    Chewing tobacco: none    Illicit drugs: denies    Advance directive: No    Live alone or with others: with others    Are there stairs in your home: Yes    International travel: yes    Pets: Yes dog     Social Determinants of Health     Financial Resource Strain: Low Risk  (10/3/2022)    Overall Financial Resource Strain (CARDIA)     Difficulty of Paying Living Expenses: Not very hard   Food Insecurity: Not on file   Transportation Needs: No Transportation Needs (10/3/2022)    PRAPARE - Transportation     Lack of Transportation (Medical): No     Lack of Transportation (Non-Medical): No   Physical Activity: Not on file   Stress: Not on file   Social Connections: Not on file   Intimate Partner Violence: Not on file   Housing Stability: Not on file       Objective     VITAL SIGNS  /74 (BP Location: Left arm, Patient Position: Sitting, Cuff Size: Adult)   Pulse 60   Ht 5' 5" (1.651 m) Comment: verbal  Wt 70.8 kg (156 lb)   BMI 25.96 kg/m²        PHYSICAL EXAMINATION    Musculoskeletal: Right Knee Examination:  General: The patient is alert, oriented, and pleasant to interact with.   Patient ambulates with Normal gait pattern  Assistive Device: No  Alignment: normal  Skin is warm and dry to touch with no signs of erythema, ecchymosis, or infection   Effusion: prepatellar bursa  ROM: 0° - 135°  verus 0° - 135° on contralateral side  TTP: None  2+ DP and PT pulses with brisk capillary refill to the toes  Sural, saphenous, tibial, superficial, and deep peroneal motor and sensory distributions intact  Sensation light touch intact distally      RADIOGRAPHIC EXAMINATION/DIAGNOSTICS:  Procedure: XR knee 3 vw right non injury    Result Date: 9/27/2023  Narrative: RIGHT KNEE INDICATION:   M25.561: Pain in right knee M25.461: Effusion, right knee. COMPARISON:  None VIEWS:  XR KNEE 3 VW RIGHT NON INJURY Images: 3 FINDINGS: There is no acute fracture or dislocation. There is no joint effusion. No significant degenerative changes. No lytic or blastic osseous lesion. Soft tissues are unremarkable. Impression: No acute osseous abnormality. Workstation performed: [id number]      ASSESSMENT/PLAN:  Right knee prepatellar bursitis    Given patient history, exam findings, and diagnostic imaging, patient appears to have a prepatellar bursa. Given he is asymptomatic other than the prepatellar bursa swelling, recommend that he continue with conservative management. Recommended rest, anti-inflammatories, and RICE as needed. We will see the patient on an as-needed basis. Encouraged to follow-up for any worsening symptoms or new concerns. Patient is understanding and agreeable to this plan.

## 2023-11-06 ENCOUNTER — OFFICE VISIT (OUTPATIENT)
Dept: FAMILY MEDICINE CLINIC | Facility: CLINIC | Age: 78
End: 2023-11-06
Payer: COMMERCIAL

## 2023-11-06 VITALS
SYSTOLIC BLOOD PRESSURE: 110 MMHG | WEIGHT: 156 LBS | DIASTOLIC BLOOD PRESSURE: 90 MMHG | HEART RATE: 93 BPM | HEIGHT: 64 IN | TEMPERATURE: 97.3 F | BODY MASS INDEX: 26.63 KG/M2 | OXYGEN SATURATION: 98 % | RESPIRATION RATE: 16 BRPM

## 2023-11-06 DIAGNOSIS — Z28.21 INFLUENZA VACCINATION DECLINED: ICD-10-CM

## 2023-11-06 DIAGNOSIS — R05.9 COUGH, UNSPECIFIED TYPE: ICD-10-CM

## 2023-11-06 DIAGNOSIS — I48.91 ATRIAL FIBRILLATION, UNSPECIFIED TYPE (HCC): ICD-10-CM
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
## 2023-11-06 DIAGNOSIS — F10.90 HEAVY ALCOHOL CONSUMPTION: ICD-10-CM

## 2023-11-06 DIAGNOSIS — I10 PRIMARY HYPERTENSION: Primary | ICD-10-CM

## 2023-11-06 PROCEDURE — 99214 OFFICE O/P EST MOD 30 MIN: CPT | Performed by: INTERNAL MEDICINE

## 2024-01-11 ENCOUNTER — OFFICE VISIT (OUTPATIENT)
Dept: PODIATRY | Facility: CLINIC | Age: 79
End: 2024-01-11
Payer: COMMERCIAL

## 2024-01-11 VITALS
SYSTOLIC BLOOD PRESSURE: 112 MMHG | DIASTOLIC BLOOD PRESSURE: 77 MMHG | WEIGHT: 155.6 LBS | BODY MASS INDEX: 26.56 KG/M2 | HEIGHT: 64 IN | HEART RATE: 80 BPM

## 2024-01-11 DIAGNOSIS — M21.619 BUNION: Primary | ICD-10-CM

## 2024-01-11 PROCEDURE — 99202 OFFICE O/P NEW SF 15 MIN: CPT | Performed by: PODIATRIST

## 2024-01-11 NOTE — PROGRESS NOTES
PATIENT:  Maykel Loera  1945       ASSESSMENT:     1. Bunion                  PLAN:  1. Reviewed medical records.  Patient was counseled and educated on the condition and the diagnosis.    2. The diagnosis, treatment options and prognosis were discussed with the patient.    3. He has no acute pedal disorder.  No evidence of vascular or neurologic disorder in his feet.  He has bunion, but it is not symptomatic.    4. Instructed proper skin care and footwear.       Imaging: I have personally reviewed pertinent films in PACS  Labs, pathology, and Other Studies: I have personally reviewed pertinent reports.        Subjective:       HPI  The patient presents for foot exam.  No history of diabetes.  No pain in his feet.  He has some deformity.  No associated numbness or paresthesia.  No significant weakness or dysfunction.         The following portions of the patient's history were reviewed and updated as appropriate: allergies, current medications, past family history, past medical history, past social history, past surgical history and problem list.  All pertinent labs and images were reviewed.      Past Medical History  Past Medical History:   Diagnosis Date   • Actinic keratosis     scalp   • Basal cell carcinoma of forehead    • History of transfusion    • HTN (hypertension)    • Infectious viral hepatitis    • Left rotator cuff tear     saw ortho at [facility] 9/30/15   • Malignant melanoma in situ (HCC) 05/09/2019    back; [facility]       Past Surgical History  Past Surgical History:   Procedure Laterality Date   • COLONOSCOPY  01/08/2020    diverticulosis   • FRACTURE SURGERY  1983    trauma   • SKIN LESION EXCISION          Allergies:  Patient has no known allergies.    Medications:  Current Outpatient Medications   Medication Sig Dispense Refill   • dextromethorphan-guaifenesin (MUCINEX DM)  MG per 12 hr tablet Take 1 tablet by mouth every 12 (twelve) hours (Patient not  taking: Reported on 2024) 60 tablet 3     No current facility-administered medications for this visit.       Social History:  Social History     Socioeconomic History   • Marital status: Single     Spouse name: None   • Number of children: None   • Years of education: None   • Highest education level: None   Occupational History   • None   Tobacco Use   • Smoking status: Former   • Smokeless tobacco: Never   • Tobacco comments:     quit at age 18   Vaping Use   • Vaping status: Never Used   Substance and Sexual Activity   • Alcohol use: Yes     Comment: glass of wine with meals   • Drug use: Never   • Sexual activity: Not Currently   Other Topics Concern   • None   Social History Narrative    Most recent tobacco use screenin2019    Occupation: Retired    Marital status:     Sexual orientation: Heterosexual    Exercise level: Occasional    Runs 5 miles daily with dog    Diet: Regular    Has smoked since age: teenager    Alcohol intake: Occasional socially    Caffeine intake: Occasional    coffee every am    Chewing tobacco: none    Illicit drugs: denies    Advance directive: No    Live alone or with others: with others    Are there stairs in your home: Yes    International travel: yes    Pets: Yes dog     Social Determinants of Health     Financial Resource Strain: Low Risk  (10/3/2022)    Overall Financial Resource Strain (CARDIA)    • Difficulty of Paying Living Expenses: Not very hard   Food Insecurity: Not on file   Transportation Needs: No Transportation Needs (10/3/2022)    PRAPARE - Transportation    • Lack of Transportation (Medical): No    • Lack of Transportation (Non-Medical): No   Physical Activity: Not on file   Stress: Not on file   Social Connections: Not on file   Intimate Partner Violence: Not on file   Housing Stability: Not on file          Review of Systems   Constitutional:  Negative for chills and fever.   Respiratory:  Negative for cough and shortness of breath.   "  Cardiovascular:  Negative for chest pain.   Gastrointestinal:  Negative for nausea and vomiting.   Musculoskeletal:  Negative for gait problem.   Skin:  Negative for wound.   Allergic/Immunologic: Negative for immunocompromised state.   Neurological:  Negative for weakness and numbness.       Objective:      /77   Pulse 80   Ht 5' 4\" (1.626 m)   Wt 70.6 kg (155 lb 9.6 oz)   BMI 26.71 kg/m²          Physical Exam  Vitals reviewed.   Constitutional:       General: He is not in acute distress.     Appearance: He is not toxic-appearing or diaphoretic.   HENT:      Head: Normocephalic and atraumatic.   Eyes:      Extraocular Movements: Extraocular movements intact.   Cardiovascular:      Rate and Rhythm: Normal rate and regular rhythm.      Pulses: Normal pulses.           Dorsalis pedis pulses are 2+ on the right side and 2+ on the left side.        Posterior tibial pulses are 2+ on the right side and 2+ on the left side.   Pulmonary:      Effort: Pulmonary effort is normal. No respiratory distress.   Musculoskeletal:         General: Deformity present. No tenderness or signs of injury.      Cervical back: Normal range of motion and neck supple.      Right foot: No foot drop.      Left foot: No foot drop.      Comments: Bunion presents bilaterally.   Feet:      Right foot:      Protective Sensation: 10 sites tested.  10 sites sensed.      Left foot:      Protective Sensation: 10 sites tested.  10 sites sensed.   Skin:     General: Skin is warm.      Capillary Refill: Capillary refill takes less than 2 seconds.      Coloration: Skin is not cyanotic or mottled.      Findings: No abscess, ecchymosis, signs of injury or wound.      Nails: There is no clubbing.   Neurological:      General: No focal deficit present.      Mental Status: He is alert and oriented to person, place, and time.      Cranial Nerves: No cranial nerve deficit.      Sensory: No sensory deficit.      Motor: No weakness.      Coordination: " Coordination normal.   Psychiatric:         Mood and Affect: Mood normal.         Behavior: Behavior normal.         Thought Content: Thought content normal.         Judgment: Judgment normal.

## 2024-03-20 ENCOUNTER — TELEPHONE (OUTPATIENT)
Age: 79
End: 2024-03-20

## 2024-03-20 NOTE — TELEPHONE ENCOUNTER
Per wife's phone call, she is retuning missed phone call in regards to the appt on 4/26. Per wife, they cannot make a Friday appt. Patient has been rescheduled to 5/1/24

## 2024-04-10 ENCOUNTER — TELEPHONE (OUTPATIENT)
Age: 79
End: 2024-04-10

## 2024-05-01 ENCOUNTER — OFFICE VISIT (OUTPATIENT)
Dept: FAMILY MEDICINE CLINIC | Facility: CLINIC | Age: 79
End: 2024-05-01
Payer: COMMERCIAL

## 2024-05-01 VITALS
OXYGEN SATURATION: 95 % | DIASTOLIC BLOOD PRESSURE: 80 MMHG | BODY MASS INDEX: 28.53 KG/M2 | RESPIRATION RATE: 16 BRPM | TEMPERATURE: 96.2 F | WEIGHT: 161 LBS | SYSTOLIC BLOOD PRESSURE: 150 MMHG | HEIGHT: 63 IN | HEART RATE: 63 BPM

## 2024-05-01 DIAGNOSIS — N40.1 BENIGN PROSTATIC HYPERPLASIA WITH URINARY FREQUENCY: ICD-10-CM

## 2024-05-01 DIAGNOSIS — Z28.21 PNEUMOCOCCAL VACCINATION DECLINED: ICD-10-CM

## 2024-05-01 DIAGNOSIS — F10.90 HEAVY ALCOHOL CONSUMPTION: ICD-10-CM

## 2024-05-01 DIAGNOSIS — Z00.00 MEDICARE ANNUAL WELLNESS VISIT, SUBSEQUENT: Primary | ICD-10-CM

## 2024-05-01 DIAGNOSIS — I10 PRIMARY HYPERTENSION: ICD-10-CM

## 2024-05-01 DIAGNOSIS — I48.91 ATRIAL FIBRILLATION, UNSPECIFIED TYPE (HCC): ICD-10-CM

## 2024-05-01 DIAGNOSIS — R35.0 BENIGN PROSTATIC HYPERPLASIA WITH URINARY FREQUENCY: ICD-10-CM

## 2024-05-01 PROBLEM — N40.0 BPH (BENIGN PROSTATIC HYPERPLASIA): Status: ACTIVE | Noted: 2024-05-01

## 2024-05-01 PROCEDURE — 3079F DIAST BP 80-89 MM HG: CPT | Performed by: INTERNAL MEDICINE

## 2024-05-01 PROCEDURE — 3288F FALL RISK ASSESSMENT DOCD: CPT | Performed by: INTERNAL MEDICINE

## 2024-05-01 PROCEDURE — 3077F SYST BP >= 140 MM HG: CPT | Performed by: INTERNAL MEDICINE

## 2024-05-01 PROCEDURE — G0439 PPPS, SUBSEQ VISIT: HCPCS | Performed by: INTERNAL MEDICINE

## 2024-05-01 PROCEDURE — 99214 OFFICE O/P EST MOD 30 MIN: CPT | Performed by: INTERNAL MEDICINE

## 2024-05-01 PROCEDURE — 1160F RVW MEDS BY RX/DR IN RCRD: CPT | Performed by: INTERNAL MEDICINE

## 2024-05-01 PROCEDURE — 1159F MED LIST DOCD IN RCRD: CPT | Performed by: INTERNAL MEDICINE

## 2024-05-01 PROCEDURE — 1123F ACP DISCUSS/DSCN MKR DOCD: CPT | Performed by: INTERNAL MEDICINE

## 2024-05-01 PROCEDURE — 1125F AMNT PAIN NOTED PAIN PRSNT: CPT | Performed by: INTERNAL MEDICINE

## 2024-05-01 PROCEDURE — 3725F SCREEN DEPRESSION PERFORMED: CPT | Performed by: INTERNAL MEDICINE

## 2024-05-01 PROCEDURE — 1170F FXNL STATUS ASSESSED: CPT | Performed by: INTERNAL MEDICINE

## 2024-05-01 RX ORDER — TAMSULOSIN HYDROCHLORIDE 0.4 MG/1
0.4 CAPSULE ORAL
Qty: 30 CAPSULE | Refills: 5 | Status: SHIPPED | OUTPATIENT
Start: 2024-05-01 | End: 2024-05-08

## 2024-05-01 NOTE — PATIENT INSTRUCTIONS
Medicare Preventive Visit Patient Instructions  Thank you for completing your Welcome to Medicare Visit or Medicare Annual Wellness Visit today. Your next wellness visit will be due in one year (5/2/2025).  The screening/preventive services that you may require over the next 5-10 years are detailed below. Some tests may not apply to you based off risk factors and/or age. Screening tests ordered at today's visit but not completed yet may show as past due. Also, please note that scanned in results may not display below.  Preventive Screenings:  Service Recommendations Previous Testing/Comments   Colorectal Cancer Screening  Colonoscopy    Fecal Occult Blood Test (FOBT)/Fecal Immunochemical Test (FIT)  Fecal DNA/Cologuard Test  Flexible Sigmoidoscopy Age: 45-75 years old   Colonoscopy: every 10 years (May be performed more frequently if at higher risk)  OR  FOBT/FIT: every 1 year  OR  Cologuard: every 3 years  OR  Sigmoidoscopy: every 5 years  Screening may be recommended earlier than age 45 if at higher risk for colorectal cancer. Also, an individualized decision between you and your healthcare provider will decide whether screening between the ages of 76-85 would be appropriate. Colonoscopy: 01/08/2020  FOBT/FIT: Not on file  Cologuard: Not on file  Sigmoidoscopy: Not on file    Screening Current     Prostate Cancer Screening Individualized decision between patient and health care provider in men between ages of 55-69   Medicare will cover every 12 months beginning on the day after your 50th birthday PSA: 3.41 ng/mL     Screening Not Indicated     Hepatitis C Screening Once for adults born between 1945 and 1965  More frequently in patients at high risk for Hepatitis C Hep C Antibody: 09/27/2023    Screening Current   Diabetes Screening 1-2 times per year if you're at risk for diabetes or have pre-diabetes Fasting glucose: 101 mg/dL (9/27/2023)  A1C: No results in last 5 years (No results in last 5 years)  Screening  Current   Cholesterol Screening Once every 5 years if you don't have a lipid disorder. May order more often based on risk factors. Lipid panel: 09/27/2023  Screening Current      Other Preventive Screenings Covered by Medicare:  Abdominal Aortic Aneurysm (AAA) Screening: covered once if your at risk. You're considered to be at risk if you have a family history of AAA or a male between the age of 65-75 who smoking at least 100 cigarettes in your lifetime.  Lung Cancer Screening: covers low dose CT scan once per year if you meet all of the following conditions: (1) Age 55-77; (2) No signs or symptoms of lung cancer; (3) Current smoker or have quit smoking within the last 15 years; (4) You have a tobacco smoking history of at least 20 pack years (packs per day x number of years you smoked); (5) You get a written order from a healthcare provider.  Glaucoma Screening: covered annually if you're considered high risk: (1) You have diabetes OR (2) Family history of glaucoma OR (3)  aged 50 and older OR (4)  American aged 65 and older  Osteoporosis Screening: covered every 2 years if you meet one of the following conditions: (1) Have a vertebral abnormality; (2) On glucocorticoid therapy for more than 3 months; (3) Have primary hyperparathyroidism; (4) On osteoporosis medications and need to assess response to drug therapy.  HIV Screening: covered annually if you're between the age of 15-65. Also covered annually if you are younger than 15 and older than 65 with risk factors for HIV infection. For pregnant patients, it is covered up to 3 times per pregnancy.    Immunizations:  Immunization Recommendations   Influenza Vaccine Annual influenza vaccination during flu season is recommended for all persons aged >= 6 months who do not have contraindications   Pneumococcal Vaccine   * Pneumococcal conjugate vaccine = PCV13 (Prevnar 13), PCV15 (Vaxneuvance), PCV20 (Prevnar 20)  * Pneumococcal polysaccharide  vaccine = PPSV23 (Pneumovax) Adults 19-65 yo with certain risk factors or if 65+ yo  If never received any pneumonia vaccine: recommend Prevnar 20 (PCV20)  Give PCV20 if previously received 1 dose of PCV13 or PPSV23   Hepatitis B Vaccine 3 dose series if at intermediate or high risk (ex: diabetes, end stage renal disease, liver disease)   Respiratory syncytial virus (RSV) Vaccine - COVERED BY MEDICARE PART D  * RSVPreF3 (Arexvy) CDC recommends that adults 60 years of age and older may receive a single dose of RSV vaccine using shared clinical decision-making (SCDM)   Tetanus (Td) Vaccine - COST NOT COVERED BY MEDICARE PART B Following completion of primary series, a booster dose should be given every 10 years to maintain immunity against tetanus. Td may also be given as tetanus wound prophylaxis.   Tdap Vaccine - COST NOT COVERED BY MEDICARE PART B Recommended at least once for all adults. For pregnant patients, recommended with each pregnancy.   Shingles Vaccine (Shingrix) - COST NOT COVERED BY MEDICARE PART B  2 shot series recommended in those 19 years and older who have or will have weakened immune systems or those 50 years and older     Health Maintenance Due:      Topic Date Due   • Hepatitis C Screening  Completed     Immunizations Due:      Topic Date Due   • Pneumococcal Vaccine: 65+ Years (1 of 1 - PCV) Never done   • COVID-19 Vaccine (6 - 2023-24 season) 09/01/2023     Advance Directives   What are advance directives?  Advance directives are legal documents that state your wishes and plans for medical care. These plans are made ahead of time in case you lose your ability to make decisions for yourself. Advance directives can apply to any medical decision, such as the treatments you want, and if you want to donate organs.   What are the types of advance directives?  There are many types of advance directives, and each state has rules about how to use them. You may choose a combination of any of the  following:  Living will:  This is a written record of the treatment you want. You can also choose which treatments you do not want, which to limit, and which to stop at a certain time. This includes surgery, medicine, IV fluid, and tube feedings.   Durable power of  for healthcare (DPAHC):  This is a written record that states who you want to make healthcare choices for you when you are unable to make them for yourself. This person, called a proxy, is usually a family member or a friend. You may choose more than 1 proxy.  Do not resuscitate (DNR) order:  A DNR order is used in case your heart stops beating or you stop breathing. It is a request not to have certain forms of treatment, such as CPR. A DNR order may be included in other types of advance directives.  Medical directive:  This covers the care that you want if you are in a coma, near death, or unable to make decisions for yourself. You can list the treatments you want for each condition. Treatment may include pain medicine, surgery, blood transfusions, dialysis, IV or tube feedings, and a ventilator (breathing machine).  Values history:  This document has questions about your views, beliefs, and how you feel and think about life. This information can help others choose the care that you would choose.  Why are advance directives important?  An advance directive helps you control your care. Although spoken wishes may be used, it is better to have your wishes written down. Spoken wishes can be misunderstood, or not followed. Treatments may be given even if you do not want them. An advance directive may make it easier for your family to make difficult choices about your care.   Weight Management   Why it is important to manage your weight:  Being overweight increases your risk of health conditions such as heart disease, high blood pressure, type 2 diabetes, and certain types of cancer. It can also increase your risk for osteoarthritis, sleep apnea, and  other respiratory problems. Aim for a slow, steady weight loss. Even a small amount of weight loss can lower your risk of health problems.  How to lose weight safely:  A safe and healthy way to lose weight is to eat fewer calories and get regular exercise. You can lose up about 1 pound a week by decreasing the number of calories you eat by 500 calories each day.   Healthy meal plan for weight management:  A healthy meal plan includes a variety of foods, contains fewer calories, and helps you stay healthy. A healthy meal plan includes the following:  Eat whole-grain foods more often.  A healthy meal plan should contain fiber. Fiber is the part of grains, fruits, and vegetables that is not broken down by your body. Whole-grain foods are healthy and provide extra fiber in your diet. Some examples of whole-grain foods are whole-wheat breads and pastas, oatmeal, brown rice, and bulgur.  Eat a variety of vegetables every day.  Include dark, leafy greens such as spinach, kale, wilfredo greens, and mustard greens. Eat yellow and orange vegetables such as carrots, sweet potatoes, and winter squash.   Eat a variety of fruits every day.  Choose fresh or canned fruit (canned in its own juice or light syrup) instead of juice. Fruit juice has very little or no fiber.  Eat low-fat dairy foods.  Drink fat-free (skim) milk or 1% milk. Eat fat-free yogurt and low-fat cottage cheese. Try low-fat cheeses such as mozzarella and other reduced-fat cheeses.  Choose meat and other protein foods that are low in fat.  Choose beans or other legumes such as split peas or lentils. Choose fish, skinless poultry (chicken or turkey), or lean cuts of red meat (beef or pork). Before you cook meat or poultry, cut off any visible fat.   Use less fat and oil.  Try baking foods instead of frying them. Add less fat, such as margarine, sour cream, regular salad dressing and mayonnaise to foods. Eat fewer high-fat foods. Some examples of high-fat foods  include french fries, doughnuts, ice cream, and cakes.  Eat fewer sweets.  Limit foods and drinks that are high in sugar. This includes candy, cookies, regular soda, and sweetened drinks.  Exercise:  Exercise at least 30 minutes per day on most days of the week. Some examples of exercise include walking, biking, dancing, and swimming. You can also fit in more physical activity by taking the stairs instead of the elevator or parking farther away from stores. Ask your healthcare provider about the best exercise plan for you.      © Copyright [employer] 2018 Information is for End User's use only and may not be sold, redistributed or otherwise used for commercial purposes. All illustrations and images included in CareNotes® are the copyrighted property of [employer] or [employer]

## 2024-05-01 NOTE — PROGRESS NOTES
Assessment and Plan:     Problem List Items Addressed This Visit       HTN (hypertension)    Relevant Orders    CBC and differential    Comprehensive metabolic panel    TSH, 3rd generation    Lipid panel    Heavy alcohol consumption    Atrial fibrillation, unspecified type (HCC)    BPH (benign prostatic hyperplasia)    Relevant Medications    tamsulosin (FLOMAX) 0.4 mg     Other Visit Diagnoses       Medicare annual wellness visit, subsequent    -  Primary    Pneumococcal vaccination declined            Maykel doing well, working and running with his dog . Did discuss his heavy alcohol consumption and counseled on cutting back. BP elevated today-counseled on alcohol and sodium reduction and advised to monitor bp at home-labs ordered. Has known BPH with PSA of about 3.84 now and I did discuss possibly trying some flomax to see if it helps some of his LUTsxs. Labs ordered     Preventive health issues were discussed with patient, and age appropriate screening tests were ordered as noted in patient's After Visit Summary.  Personalized health advice and appropriate referrals for health education or preventive services given if needed, as noted in patient's After Visit Summary.     History of Present Illness:     Patient presents for a Medicare Wellness Visit    Maykel here with his wife Nkechi for MAWV-hx also of elevated bp, hx of atrial fib, and BPH, heavy alcohol use-Maykel does not drink water, only wine.  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    HENT: Negative.     Respiratory: Negative.     Cardiovascular: Negative.    Gastrointestinal: Negative.    Genitourinary:  Positive for frequency and  urgency.   Neurological: Negative.    Hematological: Negative.    Psychiatric/Behavioral: Negative.          Problem List:     Patient Active Problem List   Diagnosis    Hearing loss    HTN (hypertension)    Heavy alcohol consumption    Atrial fibrillation, unspecified type (HCC)    BPH (benign prostatic hyperplasia)      Past Medical and Surgical History:     Past Medical History:   Diagnosis Date    Actinic keratosis     scalp    Basal cell carcinoma of forehead     History of transfusion     HTN (hypertension)     Infectious viral hepatitis     Left rotator cuff tear     saw ortho at [facility] 9/30/15    Malignant melanoma in situ (HCC) 2019    back; jonathan     Past Surgical History:   Procedure Laterality Date    COLONOSCOPY  2020    diverticulosis    FRACTURE SURGERY  1983    trauma    SKIN LESION EXCISION        Family History:     Family History   Problem Relation Age of Onset    No Known Problems Mother     No Known Problems Father       Social History:     Social History     Socioeconomic History    Marital status: Single     Spouse name: None    Number of children: None    Years of education: None    Highest education level: None   Occupational History    None   Tobacco Use    Smoking status: Former    Smokeless tobacco: Never    Tobacco comments:     quit at age 18   Vaping Use    Vaping status: Never Used   Substance and Sexual Activity    Alcohol use: Yes     Comment: glass of wine with meals    Drug use: Never    Sexual activity: Not Currently   Other Topics Concern    None   Social History Narrative    Most recent tobacco use screenin2019    Occupation: Retired    Marital status:     Sexual orientation: Heterosexual    Exercise level: Occasional    Runs 5 miles daily with dog    Diet: Regular    Has smoked since age: teenager    Alcohol intake: Occasional socially    Caffeine intake: Occasional    coffee every am    Chewing tobacco: none    Illicit drugs: denies     Advance directive: No    Live alone or with others: with others    Are there stairs in your home: Yes    International travel: yes    Pets: Yes dog     Social Determinants of Health     Financial Resource Strain: Low Risk  (10/3/2022)    Overall Financial Resource Strain (CARDIA)     Difficulty of Paying Living Expenses: Not very hard   Food Insecurity: No Food Insecurity (5/1/2024)    Hunger Vital Sign     Worried About Running Out of Food in the Last Year: Never true     Ran Out of Food in the Last Year: Never true   Transportation Needs: No Transportation Needs (5/1/2024)    PRAPARE - Transportation     Lack of Transportation (Medical): No     Lack of Transportation (Non-Medical): No   Physical Activity: Not on file   Stress: Not on file   Social Connections: Not on file   Intimate Partner Violence: Not on file   Housing Stability: Unknown (5/1/2024)    Housing Stability Vital Sign     Unable to Pay for Housing in the Last Year: No     Number of Places Lived in the Last Year: Not on file     Unstable Housing in the Last Year: No      Medications and Allergies:     Current Outpatient Medications   Medication Sig Dispense Refill    tamsulosin (FLOMAX) 0.4 mg Take 1 capsule (0.4 mg total) by mouth daily with dinner 30 capsule 5     No current facility-administered medications for this visit.     No Known Allergies   Immunizations:     Immunization History   Administered Date(s) Administered    COVID-19 PFIZER VACCINE 0.3 ML IM 02/23/2021, 03/16/2021, 10/25/2021    COVID-19 Pfizer Vac BIVALENT Hardik-sucrose 12 Yr+ IM 09/26/2022    COVID-19 Pfizer vac (Hardik-sucrose, gray cap) 12 yr+ IM 05/05/2022      Health Maintenance:         Topic Date Due    Hepatitis C Screening  Completed         Topic Date Due    Pneumococcal Vaccine: 65+ Years (1 of 1 - PCV) Never done    COVID-19 Vaccine (6 - 2023-24 season) 09/01/2023      Medicare Screening Tests and Risk Assessments:     Maykel is here for his Subsequent Wellness visit.      Health Risk Assessment:   Patient rates overall health as excellent. Patient feels that their physical health rating is same. Patient is very satisfied with their life. Eyesight was rated as slightly worse. Hearing was rated as slightly worse. Patient feels that their emotional and mental health rating is same. Patients states they are never, rarely angry. Patient states they are never, rarely unusually tired/fatigued. Pain experienced in the last 7 days has been none. Patient states that he has experienced weight loss or gain in last 6 months.     Depression Screening:   PHQ-2 Score: 0      Fall Risk Screening:   In the past year, patient has experienced: no history of falling in past year      Home Safety:  Patient does not have trouble with stairs inside or outside of their home. Patient has working smoke alarms and has working carbon monoxide detector. Home safety hazards include: none.     Nutrition:   Current diet is Regular.     Medications:   Patient is currently taking over-the-counter supplements. OTC medications include: see medication list. Patient is able to manage medications.     Activities of Daily Living (ADLs)/Instrumental Activities of Daily Living (IADLs):   Walk and transfer into and out of bed and chair?: Yes  Dress and groom yourself?: Yes    Bathe or shower yourself?: Yes    Feed yourself? Yes  Do your laundry/housekeeping?: Yes  Manage your money, pay your bills and track your expenses?: Yes  Make your own meals?: Yes    Do your own shopping?: Yes    Previous Hospitalizations:   Any hospitalizations or ED visits within the last 12 months?: No      Advance Care Planning:   Living will: No    Durable POA for healthcare: No    Advanced directive: No    ACP document given: Yes      Cognitive Screening:   Provider or family/friend/caregiver concerned regarding cognition?: No    PREVENTIVE SCREENINGS      Cardiovascular Screening:    General: Screening Current      Diabetes Screening:      "General: Screening Current      Colorectal Cancer Screening:     General: Screening Current      Prostate Cancer Screening:    General: Screening Not Indicated and Screening Current      Osteoporosis Screening:    General: Screening Not Indicated      Abdominal Aortic Aneurysm (AAA) Screening:    Risk factors include: tobacco use        General: Screening Not Indicated      Lung Cancer Screening:     General: Screening Not Indicated      Hepatitis C Screening:    General: Screening Current    Screening, Brief Intervention, and Referral to Treatment (SBIRT)    Screening  Typical number of drinks in a day: 2  Typical number of drinks in a week: 10  Interpretation: Low risk drinking behavior.    Single Item Drug Screening:  How often have you used an illegal drug (including marijuana) or a prescription medication for non-medical reasons in the past year? never    Single Item Drug Screen Score: 0  Interpretation: Negative screen for possible drug use disorder    No results found.     Physical Exam:     /80   Pulse 63   Temp (!) 96.2 °F (35.7 °C) (Tympanic)   Resp 16   Ht 5' 3\" (1.6 m)   Wt 73 kg (161 lb)   SpO2 95%   BMI 28.52 kg/m²     Physical Exam  Constitutional:       Appearance: Normal appearance.   HENT:      Head: Normocephalic and atraumatic.      Right Ear: External ear normal.      Left Ear: External ear normal.      Nose: Nose normal.      Mouth/Throat:      Mouth: Mucous membranes are moist.   Eyes:      Extraocular Movements: Extraocular movements intact.   Cardiovascular:      Rate and Rhythm: Normal rate and regular rhythm.   Pulmonary:      Effort: Pulmonary effort is normal.      Breath sounds: Normal breath sounds.   Abdominal:      Palpations: Abdomen is soft.   Musculoskeletal:         General: Normal range of motion.      Cervical back: Normal range of motion and neck supple.   Skin:     General: Skin is warm.   Neurological:      General: No focal deficit present.      Mental Status: " He is alert and oriented to person, place, and time.   Psychiatric:         Mood and Affect: Mood normal.         Thought Content: Thought content normal.         Judgment: Judgment normal.          Jessica Painter MD

## 2024-05-08 ENCOUNTER — TELEPHONE (OUTPATIENT)
Age: 79
End: 2024-05-08

## 2024-05-08 NOTE — TELEPHONE ENCOUNTER
Pt's wife called in stating the pt has been taking tamsulosin (FLOMAX) 0.4 mg [[id number]] and says he feels like he's in a different world and he doesn't think he can continue taking this medication.

## 2024-08-22 ENCOUNTER — OFFICE VISIT (OUTPATIENT)
Dept: FAMILY MEDICINE CLINIC | Facility: CLINIC | Age: 79
End: 2024-08-22
Payer: COMMERCIAL

## 2024-08-22 VITALS
SYSTOLIC BLOOD PRESSURE: 112 MMHG | RESPIRATION RATE: 18 BRPM | WEIGHT: 158 LBS | BODY MASS INDEX: 28 KG/M2 | HEART RATE: 76 BPM | DIASTOLIC BLOOD PRESSURE: 72 MMHG | HEIGHT: 63 IN | OXYGEN SATURATION: 97 % | TEMPERATURE: 98 F

## 2024-08-22 DIAGNOSIS — M54.2 NECK PAIN: Primary | ICD-10-CM

## 2024-08-22 PROCEDURE — 99213 OFFICE O/P EST LOW 20 MIN: CPT | Performed by: FAMILY MEDICINE

## 2024-08-22 PROCEDURE — G2211 COMPLEX E/M VISIT ADD ON: HCPCS | Performed by: FAMILY MEDICINE

## 2024-08-22 RX ORDER — NAPROXEN 500 MG/1
500 TABLET ORAL 2 TIMES DAILY WITH MEALS
Qty: 20 TABLET | Refills: 0 | Status: SHIPPED | OUTPATIENT
Start: 2024-08-22

## 2024-08-22 NOTE — PROGRESS NOTES
Ambulatory Visit  Name: Maykel Loera      : 1945      MRN: [id number]  Encounter Provider: Niraj Coy MD  Encounter Date: 2024   Encounter department: [facility] MEDICINE    Assessment & Plan   1. Neck pain  Assessment & Plan:  Patient has had it for past 4 weeks and likely muscular. Will start NSAID and patient declined physical therapy. Patient will use heating pad and do stretching exercises. Call if no better.   Orders:  -     naproxen (NAPROSYN) 500 mg tablet; Take 1 tablet (500 mg total) by mouth 2 (two) times a day with meals       History of Present Illness     Patient here for neck pain for past 1 month. Not getting better. Feels stiff and cracks when turns head. No injury. No radiation to arms and no numbness. Not taking any medications for it. Patient lifts a lot at work. Never had it before.     Neck Pain   Pertinent negatives include no chest pain or headaches.     Review of Systems   Constitutional:  Negative for fatigue and unexpected weight change.   Respiratory:  Negative for cough and shortness of breath.    Cardiovascular:  Negative for chest pain.   Gastrointestinal:  Negative for abdominal pain, constipation, diarrhea and vomiting.   Musculoskeletal:  Positive for neck pain. Negative for arthralgias.   Neurological:  Negative for dizziness and headaches.   Psychiatric/Behavioral:  Negative for dysphoric mood. The patient is not nervous/anxious.      Past Medical History:   Diagnosis Date   • Actinic keratosis     scalp   • Basal cell carcinoma of forehead    • History of transfusion    • HTN (hypertension)    • Infectious viral hepatitis    • Left rotator cuff tear     saw ortho at [facility] 9/30/15   • Malignant melanoma in situ (HCC) 2019    back; jonathan     Past Surgical History:   Procedure Laterality Date   • COLONOSCOPY  2020    diverticulosis   • FRACTURE SURGERY  1983    trauma   • SKIN LESION EXCISION       Family History  "  Problem Relation Age of Onset   • No Known Problems Mother    • No Known Problems Father      Social History     Tobacco Use   • Smoking status: Former   • Smokeless tobacco: Never   • Tobacco comments:     quit at age 18   Vaping Use   • Vaping status: Never Used   Substance and Sexual Activity   • Alcohol use: Yes     Comment: glass of wine with meals   • Drug use: Never   • Sexual activity: Not Currently     Current Outpatient Medications on File Prior to Visit   Medication Sig   • Multiple Vitamin (MULTIVITAMIN PO) Take 1 tablet by mouth in the morning     No Known Allergies  Immunization History   Administered Date(s) Administered   • COVID-19 PFIZER VACCINE 0.3 ML IM 02/23/2021, 03/16/2021, 10/25/2021   • COVID-19 Pfizer Vac BIVALENT Hardik-sucrose 12 Yr+ IM 09/26/2022   • COVID-19 Pfizer vac (Hardik-sucrose, gray cap) 12 yr+ IM 05/05/2022     Objective     /72   Pulse 76   Temp 98 °F (36.7 °C) (Temporal)   Resp 18   Ht 5' 3\" (1.6 m)   Wt 71.7 kg (158 lb)   SpO2 97%   BMI 27.99 kg/m²     Physical Exam  Vitals and nursing note reviewed.   Constitutional:       Appearance: Normal appearance.   Neck:      Vascular: No carotid bruit.   Cardiovascular:      Rate and Rhythm: Normal rate and regular rhythm.      Heart sounds: Normal heart sounds. No murmur heard.  Pulmonary:      Effort: Pulmonary effort is normal.      Breath sounds: Normal breath sounds. No wheezing.   Musculoskeletal:      Cervical back: Normal range of motion and neck supple. Tenderness (b/l neck) present. No muscular tenderness.      Right lower leg: No edema.      Left lower leg: No edema.   Lymphadenopathy:      Cervical: No cervical adenopathy.   Neurological:      Mental Status: He is alert.   Psychiatric:         Mood and Affect: Mood normal.         Behavior: Behavior normal.         Thought Content: Thought content normal.         Judgment: Judgment normal.         "

## 2024-08-22 NOTE — LETTER
August 22, 2024     Patient: Maykel Loera  YOB: 1945  Date of Visit: 8/22/2024      To Whom it May Concern:    Maykel Loera is under my professional care. Maykel was seen in my office on 8/22/2024. Maykel is not to lift > 10 lbs for the next 2 weeks.     If you have any questions or concerns, please don't hesitate to call.         Sincerely,          Niraj Coy MD        CC: No Recipients

## 2024-08-22 NOTE — ASSESSMENT & PLAN NOTE
Patient has had it for past 4 weeks and likely muscular. Will start NSAID and patient declined physical therapy. Patient will use heating pad and do stretching exercises. Call if no better.

## 2024-10-17 ENCOUNTER — OFFICE VISIT (OUTPATIENT)
Dept: FAMILY MEDICINE CLINIC | Facility: CLINIC | Age: 79
End: 2024-10-17
Payer: COMMERCIAL

## 2024-10-17 ENCOUNTER — TELEPHONE (OUTPATIENT)
Age: 79
End: 2024-10-17

## 2024-10-17 VITALS
HEIGHT: 63 IN | OXYGEN SATURATION: 97 % | SYSTOLIC BLOOD PRESSURE: 158 MMHG | WEIGHT: 158 LBS | DIASTOLIC BLOOD PRESSURE: 90 MMHG | BODY MASS INDEX: 28 KG/M2 | HEART RATE: 63 BPM

## 2024-10-17 DIAGNOSIS — M54.9 BACK PAIN, UNSPECIFIED BACK LOCATION, UNSPECIFIED BACK PAIN LATERALITY, UNSPECIFIED CHRONICITY: Primary | ICD-10-CM

## 2024-10-17 DIAGNOSIS — I10 PRIMARY HYPERTENSION: ICD-10-CM

## 2024-10-17 PROCEDURE — G2211 COMPLEX E/M VISIT ADD ON: HCPCS | Performed by: INTERNAL MEDICINE

## 2024-10-17 PROCEDURE — 99214 OFFICE O/P EST MOD 30 MIN: CPT | Performed by: INTERNAL MEDICINE

## 2024-10-17 RX ORDER — METHOCARBAMOL 500 MG/1
500 TABLET, FILM COATED ORAL 2 TIMES DAILY PRN
Qty: 40 TABLET | Refills: 0 | Status: SHIPPED | OUTPATIENT
Start: 2024-10-17

## 2024-10-17 RX ORDER — LIDOCAINE 50 MG/G
1 PATCH TOPICAL DAILY
Qty: 30 PATCH | Refills: 3 | Status: SHIPPED | OUTPATIENT
Start: 2024-10-17

## 2024-10-17 NOTE — PROGRESS NOTES
Assessment/Plan:Looks like Maykel has a muscle spasm in his right neck and trapezius area-pulses are equal and good, and no weakness or numbness or tingling-will try topical lidocaine, 8 hour tylenol, and a muscle relaxer prn (advised not to drive after taking this)-if it gets worse will have to Xray /do imaging-in his age group, and with the elevated bp, have to consider aortic dissection too but I highly doubt with level of pain, chronicity and equal pulses         Problem List Items Addressed This Visit       HTN (hypertension)     BP high today, a little better on retake-patient refuses any and all bp meds          Other Visit Diagnoses       Back pain, unspecified back location, unspecified back pain laterality, unspecified chronicity    -  Primary    Relevant Medications    lidocaine (Lidoderm) 5 %    methocarbamol (ROBAXIN) 500 mg tablet              Subjective:      Patient ID: Maykel Loera is a 79 y.o. male.    Maykel with neck pain and stiffness, mild shoulder pain, no numbness or tingling in his arms or hands, no sob or chest pain-bp elevated, but it's always high here-he's not taking any medications at home-no arm weakness-using heating pad and lidocaine    Neck Pain   Pertinent negatives include no numbness or weakness.       The following portions of the patient's history were reviewed and updated as appropriate:   Past Medical History:  He has a past medical history of Actinic keratosis, Basal cell carcinoma of forehead, History of transfusion, HTN (hypertension), Infectious viral hepatitis, Left rotator cuff tear, and Malignant melanoma in situ (HCC) (05/09/2019).,  _______________________________________________________________________  Medical Problems:  does not have any pertinent problems on file.,  _______________________________________________________________________  Past Surgical History:   has a past surgical history that includes Colonoscopy (01/08/2020); Fracture surgery (1983); and Skin  "lesion excision.,  _______________________________________________________________________  Family History:  family history includes No Known Problems in his father and mother.,  _______________________________________________________________________  Social History:   reports that he has quit smoking. He has never used smokeless tobacco. He reports current alcohol use. He reports that he does not use drugs.,  _______________________________________________________________________  Allergies:  has No Known Allergies..  _______________________________________________________________________  Current Outpatient Medications   Medication Sig Dispense Refill    lidocaine (Lidoderm) 5 % Apply 1 patch topically over 12 hours daily Remove & Discard patch within 12 hours or as directed by MD 30 patch 3    methocarbamol (ROBAXIN) 500 mg tablet Take 1 tablet (500 mg total) by mouth 2 (two) times a day as needed for muscle spasms 40 tablet 0    Multiple Vitamin (MULTIVITAMIN PO) Take 1 tablet by mouth in the morning      naproxen (NAPROSYN) 500 mg tablet Take 1 tablet (500 mg total) by mouth 2 (two) times a day with meals 20 tablet 0     No current facility-administered medications for this visit.     _______________________________________________________________________  Review of Systems   Respiratory: Negative.     Cardiovascular: Negative.    Musculoskeletal:  Positive for arthralgias and neck pain.   Neurological:  Negative for weakness and numbness.         Objective:  Vitals:    10/17/24 1005 10/17/24 1052   BP: 166/100 158/90   BP Location: Left arm    Patient Position: Sitting    Cuff Size: Standard    Pulse: 63    SpO2: 97%    Weight: 71.7 kg (158 lb)    Height: 5' 3\" (1.6 m)      Body mass index is 27.99 kg/m².     Physical Exam  Constitutional:       Appearance: Normal appearance.   HENT:      Head: Normocephalic and atraumatic.      Right Ear: External ear normal.      Left Ear: External ear normal.      Nose: " Nose normal.   Neck:      Vascular: No carotid bruit.   Cardiovascular:      Rate and Rhythm: Normal rate and regular rhythm.   Pulmonary:      Effort: Pulmonary effort is normal.      Breath sounds: Normal breath sounds.   Musculoskeletal:         General: No deformity.      Cervical back: Rigidity present. No tenderness.   Neurological:      General: No focal deficit present.      Mental Status: He is alert and oriented to person, place, and time.      Motor: No weakness.      Gait: Gait normal.   Psychiatric:         Mood and Affect: Mood normal.

## 2024-10-17 NOTE — TELEPHONE ENCOUNTER
PA for Lidocaine (Lidoderm) 5 % SUBMITTED     via    []CMM-KEY:   [x]Surescripts-Case ID # [id number]  []Availity-Auth ID # NDC #   []Faxed to plan   []Other website   []Phone call Case ID #     Office notes sent, clinical questions answered. Awaiting determination    Turnaround time for your insurance to make a decision on your Prior Authorization can take 7-21 business days.

## 2024-10-18 NOTE — TELEPHONE ENCOUNTER
[facility] called because she had more questions regarding this auth request. I asked if I could put her on hold to find out if Lori wanted to take her call. While chatting with Lori, called left the call.

## 2024-10-21 NOTE — TELEPHONE ENCOUNTER
PA for Lidocaine (Lidoderm) 5 % APPROVED     Date(s) approved 10- - 10-        Patient advised by          [][employer] Message  [x]Phone call   []LMOM  []L/M to call office as no active Communication consent on file  []Unable to leave detailed message as VM not approved on Communication consent       Pharmacy advised by    [x]Fax  []Phone call    Approval letter scanned into Media no letter not available at this time

## 2024-11-27 ENCOUNTER — OFFICE VISIT (OUTPATIENT)
Dept: FAMILY MEDICINE CLINIC | Facility: CLINIC | Age: 79
End: 2024-11-27
Payer: COMMERCIAL

## 2024-11-27 VITALS
BODY MASS INDEX: 28 KG/M2 | OXYGEN SATURATION: 97 % | HEART RATE: 71 BPM | SYSTOLIC BLOOD PRESSURE: 120 MMHG | RESPIRATION RATE: 16 BRPM | HEIGHT: 63 IN | WEIGHT: 158 LBS | DIASTOLIC BLOOD PRESSURE: 72 MMHG | TEMPERATURE: 98 F

## 2024-11-27 DIAGNOSIS — B86 SCABIES: Primary | ICD-10-CM

## 2024-11-27 PROCEDURE — G2211 COMPLEX E/M VISIT ADD ON: HCPCS | Performed by: FAMILY MEDICINE

## 2024-11-27 PROCEDURE — 99213 OFFICE O/P EST LOW 20 MIN: CPT | Performed by: FAMILY MEDICINE

## 2024-11-27 RX ORDER — PERMETHRIN 50 MG/G
CREAM TOPICAL ONCE
Qty: 60 G | Refills: 1 | Status: SHIPPED | OUTPATIENT
Start: 2024-11-27 | End: 2024-11-27

## 2024-11-27 NOTE — ASSESSMENT & PLAN NOTE
Elimite cream apply head to toe for 10 hours before washing off then repeat treatment in 1 week     Orders:    permethrin (ELIMITE) 5 % cream; Apply topically once for 1 dose

## 2024-11-27 NOTE — PROGRESS NOTES
"Name: Maykel Loera      : 1945      MRN: [id number]  Encounter Provider: Cori Batista MD  Encounter Date: 2024   Encounter department: [facility] MEDICINE  :  Assessment & Plan  Scabies  Elimite cream apply head to toe for 10 hours before washing off then repeat treatment in 1 week     Orders:    permethrin (ELIMITE) 5 % cream; Apply topically once for 1 dose           History of Present Illness     Pt with extremely itchy rash on arms trunk worse at night  thought he might have bed bugs but has nit seen any    Insect Bite  Associated symptoms include a rash (extensive excoriation  with red papules scattered on forearms and some on back).     Review of Systems   Skin:  Positive for rash (extensive excoriation  with red papules scattered on forearms and some on back).          Objective   /72 (BP Location: Left arm, Patient Position: Sitting, Cuff Size: Standard)   Pulse 71   Temp 98 °F (36.7 °C) (Tympanic)   Resp 16   Ht 5' 3\" (1.6 m)   Wt 71.7 kg (158 lb)   SpO2 97%   BMI 27.99 kg/m²      Physical Exam  Skin:     Findings: Rash (extensive excoriation red papuoes linear steaks on forearms and some on back) present.         "

## 2024-12-04 ENCOUNTER — OFFICE VISIT (OUTPATIENT)
Dept: FAMILY MEDICINE CLINIC | Facility: CLINIC | Age: 79
End: 2024-12-04
Payer: COMMERCIAL

## 2024-12-04 ENCOUNTER — TELEPHONE (OUTPATIENT)
Age: 79
End: 2024-12-04

## 2024-12-04 VITALS
WEIGHT: 162 LBS | OXYGEN SATURATION: 98 % | HEART RATE: 86 BPM | SYSTOLIC BLOOD PRESSURE: 134 MMHG | HEIGHT: 63 IN | BODY MASS INDEX: 28.7 KG/M2 | DIASTOLIC BLOOD PRESSURE: 80 MMHG

## 2024-12-04 DIAGNOSIS — R21 RASH: Primary | ICD-10-CM

## 2024-12-04 PROCEDURE — 99214 OFFICE O/P EST MOD 30 MIN: CPT | Performed by: INTERNAL MEDICINE

## 2024-12-04 PROCEDURE — G2211 COMPLEX E/M VISIT ADD ON: HCPCS | Performed by: INTERNAL MEDICINE

## 2024-12-04 NOTE — PROGRESS NOTES
Assessment/Plan:Maykel with rash thought to be scabies, has been on permetherin but still has rash-I also thought maybe allergic reaction to something or something else-he has no weakness or muscle pain to support polymyositis or anything like that and he refuses to do any labs etc-we did agree that if the rash is still there in 7-10 days he'll need labwork, possibly some imaging and a referral to Derm for possible skin biopsy. At his age, a skin manifestation of malignancy is always in the differential too         Problem List Items Addressed This Visit    None  Visit Diagnoses         Rash    -  Primary              Subjective:      Patient ID: Maykel Loera is a 79 y.o. male.    Maykel here to follow up on a rash he developed last week,itchy erythematous on arms, chest, and face too-was treated with permetherin and says it helped but today he still has quite a bit of erythema on his arms and chest and his face-no muscle aches or weakness no fever no sob no cough-has had some anxiety recently because of job issues -no one else in the house (wife and dog) has a rash or itching        The following portions of the patient's history were reviewed and updated as appropriate:   Past Medical History:  He has a past medical history of Actinic keratosis, Basal cell carcinoma of forehead, History of transfusion, HTN (hypertension), Infectious viral hepatitis, Left rotator cuff tear, and Malignant melanoma in situ (HCC) (05/09/2019).,  _______________________________________________________________________  Medical Problems:  does not have any pertinent problems on file.,  _______________________________________________________________________  Past Surgical History:   has a past surgical history that includes Colonoscopy (01/08/2020); Fracture surgery (1983); and Skin lesion excision.,  _______________________________________________________________________  Family History:  family history includes No Known Problems in his  "father and mother.,  _______________________________________________________________________  Social History:   reports that he has quit smoking. He has never used smokeless tobacco. He reports current alcohol use. He reports that he does not use drugs.,  _______________________________________________________________________  Allergies:  has no known allergies..  _______________________________________________________________________  No current outpatient medications on file.     No current facility-administered medications for this visit.     _______________________________________________________________________  Review of Systems   Constitutional: Negative.    HENT: Negative.     Respiratory: Negative.     Cardiovascular: Negative.    Skin:  Positive for rash.   Neurological: Negative.  Negative for weakness.   Hematological: Negative.          Objective:  Vitals:    12/04/24 1327 12/04/24 1358   BP:  134/80   Pulse: 86    SpO2: 98%    Weight: 73.5 kg (162 lb)    Height: 5' 3\" (1.6 m)      Body mass index is 28.7 kg/m².     Physical Exam  Constitutional:       Appearance: Normal appearance.   HENT:      Head: Normocephalic and atraumatic.      Right Ear: External ear normal.      Left Ear: External ear normal.      Nose: Nose normal.      Mouth/Throat:      Mouth: Mucous membranes are moist.   Eyes:      Extraocular Movements: Extraocular movements intact.   Cardiovascular:      Rate and Rhythm: Normal rate and regular rhythm.   Pulmonary:      Effort: Pulmonary effort is normal.      Breath sounds: Normal breath sounds.   Musculoskeletal:      Cervical back: Normal range of motion and neck supple.      Right lower leg: No edema.      Left lower leg: No edema.   Lymphadenopathy:      Cervical: No cervical adenopathy.   Skin:     Findings: Erythema and rash present.      Comments: Erythematous rash on his arms, hands, and on chest and erythema face   Neurological:      Mental Status: He is alert.      Cranial " Nerves: No cranial nerve deficit.      Motor: No weakness.

## 2024-12-04 NOTE — TELEPHONE ENCOUNTER
We did speak about dermatologist and we agreed that if the rash is still present in 7-10 days we would do labs, and derm referral

## 2024-12-04 NOTE — TELEPHONE ENCOUNTER
"Patient calls stating he was seen in the office today for a rash.  He returned home and realized the rash is also on his chest and back now too.  Patient is very anxious on the phone.  I questioned if he feeling SOB after hearing SOB via phone.  Patient stated \" I am pacing back and forth right now & very anxious so that is why I sound SOB \" .      Denies swelling in face, eyes, or lips, SOB, chest pain, N/V.   He did state he feels some weakness while walking around.  Patient asking if he should reach out to his dermatologist?     Please review and call the patient back.  Reviewed S/S to head to the ER for.  Patient verbalized understanding.  "

## 2024-12-06 NOTE — TELEPHONE ENCOUNTER
I'm wondering if he forgot the conversation we had-we said we could order labwork and refer to Dermatology if rash is still there after 7-10 days because it may need to be biopsied

## 2024-12-06 NOTE — TELEPHONE ENCOUNTER
Pt called to see if Dr Painter recommended anything else for the rash that had moved to his back. He said he would callback if it has not cleared in 7 days.

## 2024-12-09 ENCOUNTER — TELEPHONE (OUTPATIENT)
Dept: FAMILY MEDICINE CLINIC | Facility: CLINIC | Age: 79
End: 2024-12-09

## 2024-12-09 NOTE — TELEPHONE ENCOUNTER
Patient's spouse  called the RX Refill Line. Message is being forwarded to the office.     Patient's spouse is requesting Lidocaine (Lidoderm) 5%      Please contact patient at [phone]

## 2024-12-10 DIAGNOSIS — Z76.0 MEDICATION REFILL: Primary | ICD-10-CM

## 2024-12-10 RX ORDER — LIDOCAINE 50 MG/G
1 PATCH TOPICAL DAILY
Qty: 30 PATCH | Refills: 3 | Status: SHIPPED | OUTPATIENT
Start: 2024-12-10 | End: 2024-12-11 | Stop reason: SDUPTHER

## 2024-12-10 NOTE — TELEPHONE ENCOUNTER
Patient is requesting a refill of lidocaine patches. The prescription dated 10/17/24 was discontinued and patient does not understand why.    He would like a follow-up phone call at #[phone]    Reason for call:   [x] Refill   [] Prior Auth  [] Other:     Office:   [x] PCP/Provider - [facility] - [place] / [place]    Medication: lidocaine patch    Dose/Frequency: 5% topically over 12 hours    Quantity: 30 + refills    Pharmacy: [id number]  [place], [place] - [facility]     Does the patient have enough for 3 days?   [] Yes   [x] No - Send as HP to POD

## 2024-12-11 DIAGNOSIS — Z76.0 MEDICATION REFILL: ICD-10-CM

## 2024-12-11 RX ORDER — LIDOCAINE 50 MG/G
1 PATCH TOPICAL DAILY
Qty: 30 PATCH | Refills: 3 | Status: SHIPPED | OUTPATIENT
Start: 2024-12-11 | End: 2024-12-20 | Stop reason: SDUPTHER

## 2024-12-11 NOTE — TELEPHONE ENCOUNTER
Patient called requesting refill for Lidocaine Patch . Patient made aware medication was refilled on 12/10/24 for 30 with 3 refills to [facility]'s  pharmacy. Patient instructed to contact the pharmacy to obtain refills of medication. Patient verbalized understanding.

## 2024-12-17 ENCOUNTER — TELEPHONE (OUTPATIENT)
Age: 79
End: 2024-12-17

## 2024-12-17 DIAGNOSIS — R53.82 CHRONIC FATIGUE, UNSPECIFIED: ICD-10-CM

## 2024-12-17 DIAGNOSIS — R21 RASH: Primary | ICD-10-CM

## 2024-12-17 NOTE — TELEPHONE ENCOUNTER
Pts spouse called stating pt needs a letter from his PCP stating he has scabies.  Pt needs this asap - he could lose his job if he doesn't provide the letter  Please advise

## 2024-12-17 NOTE — TELEPHONE ENCOUNTER
Again, I do NOT think Maykel has scabies-otherwise the permetherin would have cleared his rash up-he needs to see Dermatology and likely needs labwork to help figure out what the rash is from-that all being said, I can certainly write a note for his job stating he has a rash that he's under doctor's care for-there should be no reason he can't work with rash he has, especially if it's not scabies

## 2024-12-17 NOTE — TELEPHONE ENCOUNTER
We first of all don't know if patient has scabies-that's never been proven-he did respond some to permetherin cream but last visit he still had the rash-is is all cleared up now? If so, we can say he had scabies and is cleared to go back to work-I'm really not understanding this message

## 2024-12-17 NOTE — TELEPHONE ENCOUNTER
"Wife is calling VERY upset, because \"Maykel could lose his job over this situation\".  She said the \"scabies\" has not cleared up yet, and his work needs to know if this is contagious.  She's saying this is very urgent & they want an answer asap (\"today\")  "

## 2024-12-19 ENCOUNTER — TELEPHONE (OUTPATIENT)
Age: 79
End: 2024-12-19

## 2024-12-19 DIAGNOSIS — B86 SCABIES: ICD-10-CM

## 2024-12-19 NOTE — TELEPHONE ENCOUNTER
The patients wife called back she said Dr Painter's office was going to make the patient an appointment at a dermatologists office   please advise thank you

## 2024-12-19 NOTE — TELEPHONE ENCOUNTER
Pt's wife Nkechi requested a refill for the   lidocaine (Lidoderm) 5 % as pt needs more. However, there are no refills left. Please send to:     [id number] - [place], [place] - [address]     Thank you for your help.

## 2024-12-19 NOTE — TELEPHONE ENCOUNTER
[facility] pharmacy called in regards of Rx Permethrin 5% cream, the quantity 60 gm exceeds the amount existed. Please send the prescription with a correct amount.

## 2024-12-19 NOTE — TELEPHONE ENCOUNTER
I really don't think this is going to help Maykel's rash but if they want to dispense 30 gm we can

## 2024-12-20 ENCOUNTER — TELEPHONE (OUTPATIENT)
Age: 79
End: 2024-12-20

## 2024-12-20 DIAGNOSIS — R21 RASH: Primary | ICD-10-CM

## 2024-12-20 DIAGNOSIS — Z76.0 MEDICATION REFILL: ICD-10-CM

## 2024-12-20 RX ORDER — LIDOCAINE 50 MG/G
1 PATCH TOPICAL DAILY
Qty: 30 PATCH | Refills: 3 | Status: SHIPPED | OUTPATIENT
Start: 2024-12-20

## 2024-12-20 RX ORDER — IVERMECTIN 3 MG/1
200 TABLET ORAL ONCE
Qty: 5 TABLET | Refills: 0 | Status: SHIPPED | OUTPATIENT
Start: 2024-12-20 | End: 2024-12-20

## 2024-12-20 RX ORDER — PERMETHRIN 50 MG/G
CREAM TOPICAL ONCE
Qty: 30 G | Refills: 0 | Status: SHIPPED | OUTPATIENT
Start: 2024-12-20 | End: 2024-12-20

## 2024-12-20 RX ORDER — PERMETHRIN 50 MG/G
CREAM TOPICAL
Qty: 60 G | Refills: 1 | Status: SHIPPED | OUTPATIENT
Start: 2024-12-20

## 2024-12-20 NOTE — TELEPHONE ENCOUNTER
(FYI - We first of all don't know if patient has scabies-that's never been proven (from PCP) = referrals entered are regular routine)

## 2024-12-20 NOTE — TELEPHONE ENCOUNTER
Rec'd call from patient stating he needs a NEW PATIENT appointment ASAP due to having SCABIES which has been and currently is on treatment.      Patient stated that he is going crazy out of mind due to the itching and will be dead if it's not taken care of by the dermatologist.      Advised to go back to PCP patient said he's already been there.    He also mentioned to me that he will be losing his job because of he's been out of work a lot and if he losses his job he will lose his home.

## 2024-12-20 NOTE — TELEPHONE ENCOUNTER
Hi, This patient is scheduled for next week Tuesday 12/24/2024 at 1 pm [facility] [place]. Thank you!

## 2024-12-24 ENCOUNTER — CONSULT (OUTPATIENT)
Dept: DERMATOLOGY | Facility: CLINIC | Age: 79
End: 2024-12-24
Payer: COMMERCIAL

## 2024-12-24 DIAGNOSIS — L25.9 CONTACT DERMATITIS, UNSPECIFIED CONTACT DERMATITIS TYPE, UNSPECIFIED TRIGGER: Primary | ICD-10-CM

## 2024-12-24 DIAGNOSIS — R21 RASH: ICD-10-CM

## 2024-12-24 PROCEDURE — 99204 OFFICE O/P NEW MOD 45 MIN: CPT | Performed by: STUDENT IN AN ORGANIZED HEALTH CARE EDUCATION/TRAINING PROGRAM

## 2024-12-24 RX ORDER — TRIAMCINOLONE ACETONIDE 1 MG/G
OINTMENT TOPICAL 2 TIMES DAILY
Qty: 453.6 G | Refills: 1 | Status: SHIPPED | OUTPATIENT
Start: 2024-12-24

## 2024-12-24 NOTE — PATIENT INSTRUCTIONS
CONTACT DERMATITIS    Assessment and Plan:  Diagnosis:  Contact dermatitis  Based on a thorough discussion of this condition and the management approach to it (including a comprehensive discussion of the known risks, side effects and potential benefits of treatment), the patient (family) agrees to implement the following specific plan:  Condition is not related to scabies.   *Stop permethrin cream   *Stop Ivermectin  Discussed possible biopsy if condition has not resolved at next visit  Prescribed Triamcinolone Ointment to be applied twice a day to affected areas for 2 weeks  Follow up in 2-3 weeks  Monitor for any changes    What is contact dermatitis?  Contact dermatitis is a type of eczema that results from something coming in contact with the skin.  There are 2 types:  irritant and allergic.  The majority of cases are from irritation.  Usually that is from contact with strong soaps, repeated exposure to water, contact with cleaning agents or food, or friction.  The minority are an actual allergy.  In these cases something is coming in contact with the skin and causing an allergic reaction, similar to what happens with poison ivy.  This usually occurs unexpectedly after using something for many years.  Even very tiny amounts of the substance on the skin can cause a reaction.  Some common causes are fragrances, preservatives and metals.  Sometimes this can occur when an allergic substance is eaten, as well, but most often it is from direct contact with the skin.  To determine the cause of allergy a patch test is often done.    Some general rules to follow for both types of contact dermatitis are:   Wear gloves when using strong cleansers or before prolonged contact with water (like washing dishes)   Use gentle cleansers and avoid strong soaps   Apply moisturizer to entire body after bathing    Avoid products with fragrance    Most often contact dermatitis is treated with topical medicines like topical steroids,  eucrisa, pimecrolimus or tacrolimus..  Some times oral steroids, ie prednisone, methylprednisone and prednisolone, are needed.  In chronic cases, treatment options include:  light therapy, methotrexate, cyclosporin.

## 2024-12-24 NOTE — PROGRESS NOTES
"[facility] Dermatology Clinic Note     Patient Name: Maykel Loera  Encounter Date: 12/24/2024     Have you been cared for by a [facility] Dermatologist in the last 3 years and, if so, which description applies to you?    NO.   I am considered a \"new\" patient and must complete all patient intake questions. I am MALE/not capable of bearing children.    REVIEW OF SYSTEMS:  Have you recently had or currently have any of the following? Recent fever or chills? No  Any non-healing wound? No   PAST MEDICAL HISTORY:  Have you personally ever had or currently have any of the following?  If \"YES,\" then please provide more detail. Skin cancer (such as Melanoma, Basal Cell Carcinoma, Squamous Cell Carcinoma?  No  Tuberculosis, HIV/AIDS, Hepatitis B or C: YES, Hepatitis   Radiation Treatment No   HISTORY OF IMMUNOSUPPRESSION:   Do you have a history of any of the following:  Systemic Immunosuppression such as Diabetes, Biologic or Immunotherapy, Chemotherapy, Organ Transplantation, Bone Marrow Transplantation or Prednisone?  No     Answering \"YES\" requires the addition of the dotphrase \"IMMUNOSUPPRESSED\" as the first diagnosis of the patient's visit.   FAMILY HISTORY:  Any \"first degree relatives\" (parent, brother, sister, or child) with the following?    Skin Cancer, Pancreatic or Other Cancer? No   PATIENT EXPERIENCE:    Do you want the Dermatologist to perform a COMPLETE skin exam today including a clinical examination under the \"bra and underwear\" areas?  NO  If necessary, do we have your permission to call and leave a detailed message on your Preferred Phone number that includes your specific medical information?  Yes      No Known Allergies   Current Outpatient Medications:   •  lidocaine (Lidoderm) 5 %, Apply 1 patch topically over 12 hours daily Remove & Discard patch within 12 hours or as directed by MD, Disp: 30 patch, Rfl: 3  •  permethrin (ELIMITE) 5 % cream, APPLY TOPICALLY TO AFFECTED AREA(S) ONCE FOR 1 DOSE, " Disp: 60 g, Rfl: 1  •  triamcinolone (KENALOG) 0.1 % ointment, Apply topically 2 (two) times a day to arms and on chest, Disp: 453.6 g, Rfl: 1          Whom besides the patient is providing clinical information about today's encounter?   NO ADDITIONAL HISTORIAN (patient alone provided history)    Physical Exam and Assessment/Plan by Diagnosis:    CONTACT DERMATITIS    Physical Exam:  Anatomic Location Affected:  bilateral arms and chest  Morphological Description:  eczematous plaques with lichenification   Pertinent Positives:  Pertinent Negatives: Pt reports working as a  and is exposed to certain cleaning agents.     Additional History of Present Condition:  Patient reports having rash appear one month ago. Was prescribed premetherin 5% cream for suspected scabies, however it did not resolve the symptoms. Was also given oral ivermectin without improvement. He is a  and has occupational exposures.     Assessment and Plan:  Diagnosis:  Contact dermatitis  Based on a thorough discussion of this condition and the management approach to it (including a comprehensive discussion of the known risks, side effects and potential benefits of treatment), the patient (family) agrees to implement the following specific plan:  Likely contact dermatitis due to occupational exposures. Rash is distributed on the forearms, upper chest, back only. Rash no consistent with scabies.    *Stop permethrin cream   *Stop Ivermectin  Prescribed Triamcinolone Ointment to be applied twice a day to affected areas for 2 weeks  Follow up in 2-3 weeks  Consider biopsy if fails to improve    What is contact dermatitis?  Contact dermatitis is a type of eczema that results from something coming in contact with the skin.  There are 2 types:  irritant and allergic.  The majority of cases are from irritation.  Usually that is from contact with strong soaps, repeated exposure to water, contact with cleaning agents or food, or friction.  The  minority are an actual allergy.  In these cases something is coming in contact with the skin and causing an allergic reaction, similar to what happens with poison ivy.  This usually occurs unexpectedly after using something for many years.  Even very tiny amounts of the substance on the skin can cause a reaction.  Some common causes are fragrances, preservatives and metals.  Sometimes this can occur when an allergic substance is eaten, as well, but most often it is from direct contact with the skin.  To determine the cause of allergy a patch test is often done.    Some general rules to follow for both types of contact dermatitis are:   Wear gloves when using strong cleansers or before prolonged contact with water (like washing dishes)   Use gentle cleansers and avoid strong soaps   Apply moisturizer to entire body after bathing    Avoid products with fragrance    Most often contact dermatitis is treated with topical medicines like topical steroids, eucrisa, pimecrolimus or tacrolimus..  Some times oral steroids, ie prednisone, methylprednisone and prednisolone, are needed.  In chronic cases, treatment options include:  light therapy, methotrexate, cyclosporin.     Scribe Attestation    I,:  Eddi Schaefer MA am acting as a scribe while in the presence of the attending physician.:       I,:  Howie Moreno DO personally performed the services described in this documentation    as scribed in my presence.:               How Severe Is Your Rash?: moderate Is This A New Presentation, Or A Follow-Up?: Rash

## 2024-12-27 PROBLEM — B86 SCABIES: Status: RESOLVED | Noted: 2024-11-27 | Resolved: 2024-12-27

## 2025-01-13 ENCOUNTER — OFFICE VISIT (OUTPATIENT)
Dept: DERMATOLOGY | Facility: CLINIC | Age: 80
End: 2025-01-13
Payer: COMMERCIAL

## 2025-01-13 VITALS — TEMPERATURE: 97.8 F | BODY MASS INDEX: 28.7 KG/M2 | WEIGHT: 162 LBS

## 2025-01-13 DIAGNOSIS — L25.9 CONTACT DERMATITIS, UNSPECIFIED CONTACT DERMATITIS TYPE, UNSPECIFIED TRIGGER: Primary | ICD-10-CM

## 2025-01-13 PROCEDURE — 99212 OFFICE O/P EST SF 10 MIN: CPT | Performed by: STUDENT IN AN ORGANIZED HEALTH CARE EDUCATION/TRAINING PROGRAM

## 2025-01-13 NOTE — PROGRESS NOTES
"[facility] Dermatology Clinic Note     Patient Name: Maykel Loera  Encounter Date: 1/13/25     Have you been cared for by a [facility] Dermatologist in the last 3 years and, if so, which description applies to you?    Yes.  I have been here within the last 3 years, and my medical history has NOT changed since that time.  I am MALE/not capable of bearing children.    REVIEW OF SYSTEMS:  Have you recently had or currently have any of the following? No changes in my recent health.   PAST MEDICAL HISTORY:  Have you personally ever had or currently have any of the following?  If \"YES,\" then please provide more detail. No changes in my medical history.   HISTORY OF IMMUNOSUPPRESSION: Do you have a history of any of the following:  Systemic Immunosuppression such as Diabetes, Biologic or Immunotherapy, Chemotherapy, Organ Transplantation, Bone Marrow Transplantation or Prednisone?  No     Answering \"YES\" requires the addition of the dotphrase \"IMMUNOSUPPRESSED\" as the first diagnosis of the patient's visit.   FAMILY HISTORY:  Any \"first degree relatives\" (parent, brother, sister, or child) with the following?    No changes in my family's known health.   PATIENT EXPERIENCE:    Do you want the Dermatologist to perform a COMPLETE skin exam today including a clinical examination under the \"bra and underwear\" areas?  NO  If necessary, do we have your permission to call and leave a detailed message on your Preferred Phone number that includes your specific medical information?  Yes      No Known Allergies   Current Outpatient Medications:   •  permethrin (ELIMITE) 5 % cream, APPLY TOPICALLY TO AFFECTED AREA(S) ONCE FOR 1 DOSE, Disp: 60 g, Rfl: 1  •  triamcinolone (KENALOG) 0.1 % ointment, Apply topically 2 (two) times a day to arms and on chest, Disp: 453.6 g, Rfl: 1  •  lidocaine (Lidoderm) 5 %, Apply 1 patch topically over 12 hours daily Remove & Discard patch within 12 hours or as directed by MD (Patient not taking: " Reported on 1/13/2025), Disp: 30 patch, Rfl: 3          Whom besides the patient is providing clinical information about today's encounter?   NO ADDITIONAL HISTORIAN (patient alone provided history)    Physical Exam and Assessment/Plan by Diagnosis:  CONTACT DERMATITIS- RESOLVED     Physical Exam:  Anatomic Location Affected:  bilateral arms, chest and back  Morphological Description:  eczematous plaques with lichenification   Pertinent Positives:  Pertinent Negatives: Pt reports working as a  and is exposed to certain cleaning agents.      Additional History of Present Condition:  Patient states that the rash has cleared up. No new flare ups.  Triamcinolone significantly improved rash.      Assessment and Plan:  Diagnosis:  Contact dermatitis  Based on a thorough discussion of this condition and the management approach to it (including a comprehensive discussion of the known risks, side effects and potential benefits of treatment), the patient (family) agrees to implement the following specific plan:  Continue to apply Triamcinolone 0.1 ointment twice a day for additional 3-5 days      What is contact dermatitis?  Contact dermatitis is a type of eczema that results from something coming in contact with the skin.  There are 2 types:  irritant and allergic.  The majority of cases are from irritation.  Usually that is from contact with strong soaps, repeated exposure to water, contact with cleaning agents or food, or friction.  The minority are an actual allergy.  In these cases something is coming in contact with the skin and causing an allergic reaction, similar to what happens with poison ivy.  This usually occurs unexpectedly after using something for many years.  Even very tiny amounts of the substance on the skin can cause a reaction.  Some common causes are fragrances, preservatives and metals.  Sometimes this can occur when an allergic substance is eaten, as well, but most often it is from direct  contact with the skin.  To determine the cause of allergy a patch test is often done.     Some general rules to follow for both types of contact dermatitis are:           Wear gloves when using strong cleansers or before prolonged contact with water (like washing dishes)           Use gentle cleansers and avoid strong soaps           Apply moisturizer to entire body after bathing            Avoid products with fragrance     Most often contact dermatitis is treated with topical medicines like topical steroids, eucrisa, pimecrolimus or tacrolimus..  Some times oral steroids, ie prednisone, methylprednisone and prednisolone, are needed.  In chronic cases, treatment options include:  light therapy, methotrexate, cyclosporin.    Scribe Attestation    I,:  Yolette Mcbride MA am acting as a scribe while in the presence of the attending physician.:       I,:  Howie Moreno DO personally performed the services described in this documentation    as scribed in my presence.:

## 2025-06-11 ENCOUNTER — TELEPHONE (OUTPATIENT)
Dept: FAMILY MEDICINE CLINIC | Facility: CLINIC | Age: 80
End: 2025-06-11

## 2025-06-11 NOTE — TELEPHONE ENCOUNTER
Patient is now seeing [facility]. Can we remove Dr. Painter as PCP. He has several visit in his encounter.

## 2025-06-27 NOTE — TELEPHONE ENCOUNTER
06/27/25 9:40 AM     The office's request has been received and reviewed.    The PCP has been successfully removed with a patient attribution note.     This message will now be completed.    Thank you  Britt Francisco

## 2025-06-30 ENCOUNTER — PROCEDURE VISIT (OUTPATIENT)
Dept: PODIATRY | Facility: CLINIC | Age: 80
End: 2025-06-30
Payer: COMMERCIAL

## 2025-06-30 VITALS — WEIGHT: 146 LBS | BODY MASS INDEX: 25.87 KG/M2 | HEIGHT: 63 IN

## 2025-06-30 DIAGNOSIS — M20.42 HAMMER TOES OF BOTH FEET: ICD-10-CM

## 2025-06-30 DIAGNOSIS — L85.1 ACQUIRED KERATODERMA: ICD-10-CM

## 2025-06-30 DIAGNOSIS — M21.619 BUNION: Primary | ICD-10-CM

## 2025-06-30 DIAGNOSIS — M20.41 HAMMER TOES OF BOTH FEET: ICD-10-CM

## 2025-06-30 PROCEDURE — 99213 OFFICE O/P EST LOW 20 MIN: CPT | Performed by: PODIATRIST

## 2025-06-30 NOTE — PROGRESS NOTES
PATIENT:  Maykel Loera  1945       ASSESSMENT:     1. Bunion        2. Hammer toes of both feet        3. Acquired keratoderma                  PLAN:  1. Reviewed medical records.  Patient was counseled and educated on the condition and the diagnosis.    2. The diagnosis, treatment options and prognosis were discussed with the patient.    3.  He has keratotic skin lesions associated with foot deformity.  Lesions removed.    4.  Instructed proper skin care and footwear.       Imaging: I have personally reviewed pertinent films in PACS  Labs, pathology, and Other Studies: I have personally reviewed pertinent reports.        Subjective:       HPI  The patient presents for pain in his toe.  He related this to callus. No history of diabetes.  No numbness or paresthesia.  No significant weakness or dysfunction.         The following portions of the patient's history were reviewed and updated as appropriate: allergies, current medications, past family history, past medical history, past social history, past surgical history and problem list.  All pertinent labs and images were reviewed.      Past Medical History  Past Medical History:   Diagnosis Date    Actinic keratosis     scalp    Basal cell carcinoma of forehead     History of transfusion     HTN (hypertension)     Infectious viral hepatitis     Left rotator cuff tear     saw ortho at [facility] 9/30/15    Malignant melanoma in situ (HCC) 05/09/2019    back; jonathan       Past Surgical History  Past Surgical History:   Procedure Laterality Date    COLONOSCOPY  01/08/2020    diverticulosis    FRACTURE SURGERY  1983    trauma    SKIN LESION EXCISION          Allergies:  Patient has no known allergies.    Medications:  Current Outpatient Medications   Medication Sig Dispense Refill    triamcinolone (KENALOG) 0.1 % ointment Apply topically 2 (two) times a day to arms and on chest 453.6 g 1     No current facility-administered medications for  this visit.       Social History:  Social History     Socioeconomic History    Marital status: Single   Tobacco Use    Smoking status: Former     Types: Cigarettes    Smokeless tobacco: Never    Tobacco comments:     quit at age 18   Vaping Use    Vaping status: Never Used   Substance and Sexual Activity    Alcohol use: Yes     Alcohol/week: 2.0 standard drinks of alcohol     Types: 2 Glasses of wine per week     Comment: glass of wine with meals    Drug use: Never    Sexual activity: Not Currently   Social History Narrative    Most recent tobacco use screenin2019    Occupation: Retired    Marital status:     Sexual orientation: Heterosexual    Exercise level: Occasional    Runs 5 miles daily with dog    Diet: Regular    Has smoked since age: teenager    Alcohol intake: Occasional socially    Caffeine intake: Occasional    coffee every am    Chewing tobacco: none    Illicit drugs: denies    Advance directive: No    Live alone or with others: with others    Are there stairs in your home: Yes    International travel: yes    Pets: Yes dog     Social Drivers of Health     Financial Resource Strain: Low Risk  (10/3/2022)    Overall Financial Resource Strain (CARDIA)     Difficulty of Paying Living Expenses: Not very hard   Food Insecurity: No Food Insecurity (2024)    Nursing - Inadequate Food Risk Classification     Worried About Running Out of Food in the Last Year: Never true     Ran Out of Food in the Last Year: Never true   Transportation Needs: No Transportation Needs (2024)    PRAPARE - Transportation     Lack of Transportation (Medical): No     Lack of Transportation (Non-Medical): No   Housing Stability: Unknown (2024)    Housing Stability Vital Sign     Unable to Pay for Housing in the Last Year: No     Homeless in the Last Year: No          Review of Systems   Constitutional:  Negative for chills and fever.   Respiratory:  Negative for cough and shortness of breath.   "  Cardiovascular:  Negative for chest pain.   Gastrointestinal:  Negative for nausea and vomiting.   Musculoskeletal:  Negative for gait problem.   Skin:  Negative for wound.   Allergic/Immunologic: Negative for immunocompromised state.   Neurological:  Negative for weakness and numbness.         Objective:      Ht 5' 3\" (1.6 m)   Wt 66.2 kg (146 lb)   BMI 25.86 kg/m²          Physical Exam  Vitals reviewed.   Constitutional:       General: He is not in acute distress.     Appearance: He is not toxic-appearing or diaphoretic.     Eyes:      Extraocular Movements: Extraocular movements intact.       Cardiovascular:      Rate and Rhythm: Normal rate and regular rhythm.      Pulses: Normal pulses.           Dorsalis pedis pulses are 2+ on the right side and 2+ on the left side.        Posterior tibial pulses are 2+ on the right side and 2+ on the left side.   Pulmonary:      Effort: Pulmonary effort is normal. No respiratory distress.     Musculoskeletal:         General: Deformity present. No tenderness or signs of injury.      Right foot: No foot drop.      Left foot: No foot drop.      Comments: Bunion and hammertoes present bilaterally.   Feet:      Right foot:      Protective Sensation: 10 sites tested.  10 sites sensed.      Left foot:      Protective Sensation: 10 sites tested.  10 sites sensed.     Skin:     General: Skin is warm.      Capillary Refill: Capillary refill takes less than 2 seconds.      Coloration: Skin is not cyanotic or mottled.      Findings: No abscess, ecchymosis, signs of injury or wound.      Nails: There is no clubbing.      Comments: HPK in right 5th toe PIPJ and 1st met head.     Neurological:      General: No focal deficit present.      Mental Status: He is alert and oriented to person, place, and time.      Cranial Nerves: No cranial nerve deficit.      Sensory: No sensory deficit.      Motor: No weakness.      Coordination: Coordination normal.     Psychiatric:         Mood and " Affect: Mood normal.         Behavior: Behavior normal.         Thought Content: Thought content normal.         Judgment: Judgment normal.